# Patient Record
Sex: MALE | Race: WHITE | Employment: OTHER | URBAN - METROPOLITAN AREA
[De-identification: names, ages, dates, MRNs, and addresses within clinical notes are randomized per-mention and may not be internally consistent; named-entity substitution may affect disease eponyms.]

---

## 2017-03-07 PROBLEM — I24.9 ACS (ACUTE CORONARY SYNDROME) (HCC): Status: ACTIVE | Noted: 2017-03-07

## 2017-03-07 PROBLEM — I10 HYPERTENSION: Status: ACTIVE | Noted: 2017-03-07

## 2017-03-07 PROBLEM — E78.5 HYPERLIPIDEMIA: Status: ACTIVE | Noted: 2017-03-07

## 2019-04-17 ENCOUNTER — HOSPITAL ENCOUNTER (EMERGENCY)
Age: 49
Discharge: HOME | End: 2019-04-17
Attending: EMERGENCY MEDICINE
Payer: OTHER GOVERNMENT

## 2019-04-17 VITALS
TEMPERATURE: 97.9 F | RESPIRATION RATE: 18 BRPM | DIASTOLIC BLOOD PRESSURE: 91 MMHG | HEART RATE: 74 BPM | SYSTOLIC BLOOD PRESSURE: 132 MMHG | OXYGEN SATURATION: 98 %

## 2019-04-17 DIAGNOSIS — R11.11 NON-INTRACTABLE VOMITING WITHOUT NAUSEA, UNSPECIFIED VOMITING TYPE: ICD-10-CM

## 2019-04-17 DIAGNOSIS — R20.0 LEFT ARM NUMBNESS: ICD-10-CM

## 2019-04-17 DIAGNOSIS — R25.2 MUSCLE CRAMP: ICD-10-CM

## 2019-04-17 DIAGNOSIS — R06.02 SHORTNESS OF BREATH: ICD-10-CM

## 2019-04-17 DIAGNOSIS — R51.9 ACUTE NONINTRACTABLE HEADACHE, UNSPECIFIED HEADACHE TYPE: ICD-10-CM

## 2019-04-17 DIAGNOSIS — F41.1 ANXIETY STATE: Primary | ICD-10-CM

## 2019-04-17 PROCEDURE — 6370000000 HC RX 637 (ALT 250 FOR IP): Performed by: EMERGENCY MEDICINE

## 2019-04-17 PROCEDURE — 99283 EMERGENCY DEPT VISIT LOW MDM: CPT

## 2019-04-17 RX ORDER — ATORVASTATIN CALCIUM 40 MG/1
TABLET, FILM COATED ORAL
COMMUNITY

## 2019-04-17 RX ORDER — LORAZEPAM 1 MG/1
1 TABLET ORAL ONCE
Status: COMPLETED | OUTPATIENT
Start: 2019-04-17 | End: 2019-04-17

## 2019-04-17 RX ORDER — FLUTICASONE PROPIONATE 50 MCG
SPRAY, SUSPENSION (ML) NASAL
COMMUNITY

## 2019-04-17 RX ORDER — LISINOPRIL AND HYDROCHLOROTHIAZIDE 20; 12.5 MG/1; MG/1
TABLET ORAL
COMMUNITY

## 2019-04-17 RX ORDER — BUPROPION HYDROCHLORIDE 150 MG/1
TABLET, EXTENDED RELEASE ORAL
COMMUNITY

## 2019-04-17 RX ADMIN — LORAZEPAM 1 MG: 1 TABLET ORAL at 18:31

## 2019-04-17 ASSESSMENT — PAIN DESCRIPTION - ORIENTATION: ORIENTATION: LEFT

## 2019-04-17 ASSESSMENT — PAIN SCALES - GENERAL: PAINLEVEL_OUTOF10: 3

## 2019-04-17 ASSESSMENT — PAIN DESCRIPTION - LOCATION: LOCATION: LEG

## 2019-04-17 ASSESSMENT — PAIN DESCRIPTION - PAIN TYPE: TYPE: ACUTE PAIN

## 2019-04-17 NOTE — ED PROVIDER NOTES
[facility]  ED  eMERGENCY dEPARTMENT eNCOUnter        Pt Name: Pema Soto  MRN: [id number]  [place] 1970  Date of evaluation: 4/17/2019  Provider: Michael Sandoval MD  PCP: No primary care provider on file. CHIEF COMPLAINT       Chief Complaint   Patient presents with    Anxiety     c/o hx panic attacks. approx 30 min ago started with hyperventilating and tingling all over. c/o \"knot\" pain in left upper leg       HISTORY OFPRESENT ILLNESS   (Location/Symptom, Timing/Onset, Context/Setting, Quality, Duration, Modifying Factors,Severity)  Note limiting factors. Pema Soto is a 46 y.o. male presenting today with due to concern for anxiety attack. He states that he has had well over 200 anxiety attacks in his life but states he normally is able to control these. His last panic attack was yesterday but today's attack lasted longer than he was used to associated with tingling in all the extremities but worse on the left side and complaining of vomiting once which was a new symptom and having a moderate headache. He also was short of breath but no chest pain. He normally takes 2 mg of Klonopin at home but states due to concern for the attack he did take 4 mg today. By the time I saw him, he was feeling much better but still felt slightly anxious. He also had some cramping in his left thigh muscle. Denies any leg swelling or history of blood clots. No recent prolonged travel. He did have some diarrhea earlier today as well. He does have some mild upper abdominal discomfort at this time. He denies any suicidal or homicidal ideations. No hallucinations. He had a cardiac catheterization in 2017 that was read as normal coronary arteries. Currently his headache is 4 out of 10. He does smoke cigarettes but denies any alcohol or drug use.   No other concerns at this time but again since the panic attack lasted longer than normal with some new symptoms associated with the lisinopril-hydrochlorothiazide (PRINZIDE;ZESTORETIC) 20-12.5 MG per tablet TAKE 1 TABLET BY MOUTH ONCE DAILY FOR BLOOD PRESSUREHistorical Med      buPROPion (WELLBUTRIN SR) 150 MG extended release tablet TAKE ONE TABLET BY MOUTH TWICE A DAYHistorical Med      venlafaxine (EFFEXOR XR) 75 MG extended release capsule Take 3 capsules by mouth daily (with breakfast), Disp-30 capsule, R-0Historical Med      clonazePAM (KLONOPIN) 2 MG tablet Take 2 mg by mouth 2 times daily as needed             ALLERGIES     Bactrim [sulfamethoxazole-trimethoprim]    FAMILY HISTORY     History reviewed. No pertinent family history.        SOCIAL HISTORY       Social History     Socioeconomic History    Marital status: Single     Spouse name: None    Number of children: None    Years of education: None    Highest education level: None   Occupational History    None   Social Needs    Financial resource strain: None    Food insecurity:     Worry: None     Inability: None    Transportation needs:     Medical: None     Non-medical: None   Tobacco Use    Smoking status: Current Every Day Smoker     Packs/day: 0.50    Smokeless tobacco: Never Used   Substance and Sexual Activity    Alcohol use: No    Drug use: No    Sexual activity: None   Lifestyle    Physical activity:     Days per week: None     Minutes per session: None    Stress: None   Relationships    Social connections:     Talks on phone: None     Gets together: None     Attends Gnosticism service: None     Active member of club or organization: None     Attends meetings of clubs or organizations: None     Relationship status: None    Intimate partner violence:     Fear of current or ex partner: None     Emotionally abused: None     Physically abused: None     Forced sexual activity: None   Other Topics Concern    None   Social History Narrative    None       SCREENINGS      @FLOW([id number])@      PHYSICAL EXAM    (up to 7 for level 4, 8 or more for level 5)     ED Triage Vitals [04/17/19 1631]   BP Temp Temp Source Pulse Resp SpO2 Height Weight   (!) 141/89 97.9 °F (36.6 °C) Oral 72 22 99 % -- --       Physical Exam   Constitutional: He is oriented to person, place, and time. He appears well-developed and well-nourished. He is active and cooperative. Non-toxic appearance. He does not have a sickly appearance. He does not appear ill. No distress. HENT:   Head: Normocephalic and atraumatic. Nose: Nose normal.   Mouth/Throat: Mucous membranes are normal. No oropharyngeal exudate. Eyes: Pupils are equal, round, and reactive to light. Conjunctivae and EOM are normal. Right eye exhibits no discharge. Left eye exhibits no discharge. No scleral icterus. Neck: Trachea normal, normal range of motion and full passive range of motion without pain. Neck supple. No neck rigidity. No tracheal deviation, no edema, no erythema and normal range of motion present. Cardiovascular: Normal rate, regular rhythm, intact distal pulses and normal pulses. Exam reveals no gallop and no friction rub. Pulmonary/Chest: Effort normal and breath sounds normal. No accessory muscle usage or stridor. No respiratory distress. He has no decreased breath sounds. He has no wheezes. He has no rhonchi. He has no rales. He exhibits no tenderness. Abdominal: Soft. Bowel sounds are normal. He exhibits no distension. There is no tenderness. There is no rebound and no guarding. Musculoskeletal: Normal range of motion. He exhibits no edema, tenderness or deformity. Neurological: He is alert and oriented to person, place, and time. He has normal strength. He is not disoriented. He displays no atrophy and no tremor. No cranial nerve deficit or sensory deficit. He exhibits normal muscle tone. He displays no seizure activity. Gait normal. GCS eye subscore is 4. GCS verbal subscore is 5. GCS motor subscore is 6. Skin: Skin is warm and dry. No rash noted. He is not diaphoretic. No erythema. No pallor. Psychiatric: He has a normal mood and affect. His speech is normal and behavior is normal. Judgment and thought content normal. He is not actively hallucinating. Cognition and memory are normal. He does not express impulsivity or inappropriate judgment. He expresses no homicidal and no suicidal ideation. He expresses no suicidal plans and no homicidal plans. He is attentive. Nursing note and vitals reviewed. NIHSS = 0 on my initial exam       DIAGNOSTIC RESULTS   LABS:    Labs Reviewed - No data to display    All other labs were within normal range or not returned asof this dictation. EKG: All EKG's are interpreted by the Emergency Department Physician who either signs or Co-signs this chart in the absence of a cardiologist.     Patient declined. RADIOLOGY:   Non-plain film images such as CT, Ultrasound and MRI are read by the radiologist. Alanis Yang images are visualized and preliminarily interpreted by the  ED Provider with the belowfindings:        Interpretation per the Radiologist below, if available at the time of this note:    No orders to display         PROCEDURES   Unless otherwise noted below, none     Procedures    CRITICAL CARE TIME   N/A    CONSULTS:  None    EMERGENCY DEPARTMENT COURSE and DIFFERENTIAL DIAGNOSIS/MDM:   Vitals:    Vitals:    04/17/19 1631 04/17/19 1833   BP: (!) 141/89 (!) 132/91   Pulse: 72 74   Resp: 22 18   Temp: 97.9 °F (36.6 °C)    TempSrc: Oral    SpO2: 99% 98%       Patient was given the following medications:  Medications   LORazepam (ATIVAN) tablet 1 mg (1 mg Oral Given 4/17/19 1831)     Patient was evaluated due to concern for anxiety attack that was worse than typical attacks although by the time I saw him his symptoms had mostly resolved.   Since he did state he had tingling on the entire left side of the body which was slightly different although did have tingling as well on the right but worse on the left, I did offer to do a CTA of the head and neck case this could be related to a TIA like syndrome. He has full mental capacity to make his own medical decisions and understands if it was a TIA, it could lead to a stroke in the future but at this time he declines understanding that if it was a stroke, it could cause severe disability or death. He has complained of tingling of muscle spasms to his left leg and I did discuss with him that there is always a slight chance he could have a blood clot and we could do a d-dimer to further evaluate his risk. He declined this as well understanding for was a blood clot it could cause severe disability or death. He declined any EKG or lab work. He did have a normal recent normal cardiac catheterization 2 years ago and therefore chance of acute coronary syndrome unlikely. Abdominal exam was benign and he denied wanting any medications for the initial nausea or upper abdominal pain that he did experience after vomiting. [facility] but states at this time he feels this was a panic attack and is not wanting further testing but would be happy to take the 1 mg of Ativan. He was well-appearing and in no acute distress at time of discharge and understands if he has any worsening tingling on the left side of his body, development of chest pain with shortness of breath, worsening headache, then return to the emergency department, but otherwise follow up tomorrow as [facility] since this anxiety attack was different from his typical attacks. The patient tolerated their visit well. The patient and / or the family were informed of the results of any tests, a time was given to answer questions. FINAL IMPRESSION      1. Anxiety state    2. Left arm numbness    3. Muscle cramp    4. Non-intractable vomiting without nausea, unspecified vomiting type    5. Acute nonintractable headache, unspecified headache type    6.  Shortness of breath DISPOSITION/PLAN   DISPOSITION Decision To Discharge 04/17/2019 06:43:56 PM      PATIENT REFERRED TO:  [facility]  ED  [address] [facility] [address] [facility]  Go to   If symptoms worsen    Your primary doctor    Go in 1 day  Go tomorrow like you plan to go to the [address] [facility] for further evaluation since you are declining all treatment today      DISCHARGEMEDICATIONS:  Discharge Medication List as of 4/17/2019  6:45 PM          DISCONTINUED MEDICATIONS:  Discharge Medication List as of 4/17/2019  6:45 PM                 (Please note that portions of this note were completed with a voicerecognition program.  Efforts were made to edit the dictations but occasionally words are mis-transcribed.)    Dixon Connolly MD (electronically signed)            Dixon Connolly MD  04/18/19 9092

## 2019-04-18 ASSESSMENT — ENCOUNTER SYMPTOMS
DIARRHEA: 1
CHEST TIGHTNESS: 0
SHORTNESS OF BREATH: 1
ABDOMINAL PAIN: 1
COLOR CHANGE: 0
VOMITING: 1
NAUSEA: 1
CONSTIPATION: 0
BACK PAIN: 0

## 2019-05-24 ENCOUNTER — APPOINTMENT (OUTPATIENT)
Dept: GENERAL RADIOLOGY | Age: 49
End: 2019-05-24
Payer: OTHER GOVERNMENT

## 2019-05-24 ENCOUNTER — HOSPITAL ENCOUNTER (EMERGENCY)
Age: 49
Discharge: HOME | End: 2019-05-24
Attending: EMERGENCY MEDICINE
Payer: OTHER GOVERNMENT

## 2019-05-24 VITALS
WEIGHT: 195 LBS | TEMPERATURE: 98.4 F | HEART RATE: 68 BPM | HEIGHT: 73 IN | RESPIRATION RATE: 16 BRPM | SYSTOLIC BLOOD PRESSURE: 164 MMHG | OXYGEN SATURATION: 100 % | DIASTOLIC BLOOD PRESSURE: 99 MMHG | BODY MASS INDEX: 25.84 KG/M2

## 2019-05-24 DIAGNOSIS — F41.0 PANIC ATTACK: Primary | ICD-10-CM

## 2019-05-24 LAB
A/G RATIO: 1.7 (ref 1.1–2.2)
ALBUMIN SERPL-MCNC: 4 G/DL (ref 3.4–5)
ALP BLD-CCNC: 53 U/L (ref 40–129)
ALT SERPL-CCNC: 51 U/L (ref 10–40)
ANION GAP SERPL CALCULATED.3IONS-SCNC: 15 MMOL/L (ref 3–16)
AST SERPL-CCNC: 23 U/L (ref 15–37)
BILIRUB SERPL-MCNC: 0.3 MG/DL (ref 0–1)
BUN BLDV-MCNC: 16 MG/DL (ref 7–20)
CALCIUM SERPL-MCNC: 9.7 MG/DL (ref 8.3–10.6)
CHLORIDE BLD-SCNC: 101 MMOL/L (ref 99–110)
CO2: 24 MMOL/L (ref 21–32)
CREAT SERPL-MCNC: 1.1 MG/DL (ref 0.9–1.3)
GFR AFRICAN AMERICAN: >60
GFR NON-AFRICAN AMERICAN: >60
GLOBULIN: 2.4 G/DL
GLUCOSE BLD-MCNC: 95 MG/DL (ref 70–99)
HCT VFR BLD CALC: 40.9 % (ref 40.5–52.5)
HEMOGLOBIN: 14 G/DL (ref 13.5–17.5)
MCH RBC QN AUTO: 32.9 PG (ref 26–34)
MCHC RBC AUTO-ENTMCNC: 34.3 G/DL (ref 31–36)
MCV RBC AUTO: 95.9 FL (ref 80–100)
PDW BLD-RTO: 13.1 % (ref 12.4–15.4)
PLATELET # BLD: 177 K/UL (ref 135–450)
PMV BLD AUTO: 8.1 FL (ref 5–10.5)
POTASSIUM SERPL-SCNC: 3.8 MMOL/L (ref 3.5–5.1)
RBC # BLD: 4.26 M/UL (ref 4.2–5.9)
SODIUM BLD-SCNC: 140 MMOL/L (ref 136–145)
TOTAL PROTEIN: 6.4 G/DL (ref 6.4–8.2)
TROPONIN: <0.01 NG/ML
WBC # BLD: 8.2 K/UL (ref 4–11)

## 2019-05-24 PROCEDURE — 99284 EMERGENCY DEPT VISIT MOD MDM: CPT

## 2019-05-24 PROCEDURE — 85027 COMPLETE CBC AUTOMATED: CPT

## 2019-05-24 PROCEDURE — 71045 X-RAY EXAM CHEST 1 VIEW: CPT

## 2019-05-24 PROCEDURE — 80053 COMPREHEN METABOLIC PANEL: CPT

## 2019-05-24 PROCEDURE — 93005 ELECTROCARDIOGRAM TRACING: CPT | Performed by: EMERGENCY MEDICINE

## 2019-05-24 PROCEDURE — 96374 THER/PROPH/DIAG INJ IV PUSH: CPT

## 2019-05-24 PROCEDURE — 6360000002 HC RX W HCPCS: Performed by: EMERGENCY MEDICINE

## 2019-05-24 PROCEDURE — 84484 ASSAY OF TROPONIN QUANT: CPT

## 2019-05-24 RX ORDER — LORAZEPAM 2 MG/ML
1 INJECTION INTRAMUSCULAR ONCE
Status: COMPLETED | OUTPATIENT
Start: 2019-05-24 | End: 2019-05-24

## 2019-05-24 RX ADMIN — LORAZEPAM 1 MG: 2 INJECTION, SOLUTION INTRAMUSCULAR; INTRAVENOUS at 20:41

## 2019-05-24 ASSESSMENT — ENCOUNTER SYMPTOMS
WHEEZING: 0
BLOOD IN STOOL: 0
STRIDOR: 0
RHINORRHEA: 0
CHEST TIGHTNESS: 0
EYE PAIN: 0
PHOTOPHOBIA: 0
TROUBLE SWALLOWING: 0
NAUSEA: 0
EYE REDNESS: 0
ANAL BLEEDING: 0
SORE THROAT: 0
COUGH: 0
DIARRHEA: 0
ABDOMINAL PAIN: 0
EYE DISCHARGE: 0
CONSTIPATION: 0
EYE ITCHING: 0
FACIAL SWELLING: 0
ABDOMINAL DISTENTION: 0
BACK PAIN: 0
VOMITING: 0
VOICE CHANGE: 0
SHORTNESS OF BREATH: 0
SINUS PRESSURE: 0

## 2019-05-24 ASSESSMENT — PAIN DESCRIPTION - PAIN TYPE: TYPE: ACUTE PAIN

## 2019-05-24 ASSESSMENT — PAIN DESCRIPTION - DESCRIPTORS: DESCRIPTORS: SPASM

## 2019-05-24 ASSESSMENT — PAIN SCALES - GENERAL: PAINLEVEL_OUTOF10: 4

## 2019-05-24 ASSESSMENT — PAIN DESCRIPTION - LOCATION: LOCATION: CHEST

## 2019-05-25 LAB
EKG ATRIAL RATE: 69 BPM
EKG DIAGNOSIS: NORMAL
EKG P AXIS: 32 DEGREES
EKG P-R INTERVAL: 200 MS
EKG Q-T INTERVAL: 368 MS
EKG QRS DURATION: 108 MS
EKG QTC CALCULATION (BAZETT): 394 MS
EKG R AXIS: 32 DEGREES
EKG T AXIS: 20 DEGREES
EKG VENTRICULAR RATE: 69 BPM

## 2019-05-25 PROCEDURE — 93010 ELECTROCARDIOGRAM REPORT: CPT | Performed by: INTERNAL MEDICINE

## 2019-05-25 NOTE — ED PROVIDER NOTES
 Years of education: Not on file    Highest education level: Not on file   Occupational History    Not on file   Social Needs    Financial resource strain: Not on file    Food insecurity:     Worry: Not on file     Inability: Not on file    Transportation needs:     Medical: Not on file     Non-medical: Not on file   Tobacco Use    Smoking status: Current Every Day Smoker     Packs/day: 0.50    Smokeless tobacco: Never Used   Substance and Sexual Activity    Alcohol use: No    Drug use: No    Sexual activity: Not on file   Lifestyle    Physical activity:     Days per week: Not on file     Minutes per session: Not on file    Stress: Not on file   Relationships    Social connections:     Talks on phone: Not on file     Gets together: Not on file     Attends Mandaen service: Not on file     Active member of club or organization: Not on file     Attends meetings of clubs or organizations: Not on file     Relationship status: Not on file    Intimate partner violence:     Fear of current or ex partner: Not on file     Emotionally abused: Not on file     Physically abused: Not on file     Forced sexual activity: Not on file   Other Topics Concern    Not on file   Social History Narrative    Not on file         Review of Systems   Constitutional: Negative for activity change, appetite change, chills, diaphoresis, fatigue and fever. HENT: Negative. Negative for congestion, dental problem, ear pain, facial swelling, rhinorrhea, sinus pressure, sneezing, sore throat, tinnitus, trouble swallowing and voice change. Eyes: Negative for photophobia, pain, discharge, redness, itching and visual disturbance. Respiratory: Negative for cough, chest tightness, shortness of breath, wheezing and stridor. Cardiovascular: Negative for chest pain, palpitations and leg swelling.    Gastrointestinal: Negative for abdominal distention, abdominal pain, anal bleeding, blood in stool, constipation, diarrhea, nausea and vomiting. Genitourinary: Negative for difficulty urinating, discharge, dysuria, frequency, hematuria, testicular pain and urgency. Musculoskeletal: Negative for back pain, joint swelling, neck pain and neck stiffness. Skin: Negative for rash and wound. Neurological: Negative for dizziness, syncope, facial asymmetry, speech difficulty, weakness, numbness and headaches. Hematological: Does not bruise/bleed easily. Psychiatric/Behavioral: Positive for agitation. Negative for confusion, hallucinations, self-injury, sleep disturbance and suicidal ideas. The patient is nervous/anxious. All other systems reviewed and are negative. Physical Exam   Constitutional: He is oriented to person, place, and time. He appears well-developed and well-nourished. No distress. HENT:   Head: Normocephalic and atraumatic. Right Ear: External ear normal.   Left Ear: External ear normal.   Nose: Nose normal.   Mouth/Throat: Oropharynx is clear and moist. No oropharyngeal exudate. Eyes: Pupils are equal, round, and reactive to light. Conjunctivae and EOM are normal. Right eye exhibits no discharge. Left eye exhibits no discharge. No scleral icterus. Neck: Normal range of motion. Neck supple. No JVD present. No tracheal deviation present. Cardiovascular: Normal rate, regular rhythm, normal heart sounds and intact distal pulses. Exam reveals no gallop and no friction rub. No murmur heard. Pulmonary/Chest: Effort normal and breath sounds normal. No respiratory distress. He has no wheezes. He has no rales. Abdominal: Soft. Bowel sounds are normal. He exhibits no distension and no mass. There is no tenderness. There is no rebound and no guarding. Musculoskeletal: Normal range of motion. He exhibits no edema or tenderness. Lymphadenopathy:     He has no cervical adenopathy. Neurological: He is alert and oriented to person, place, and time. He has normal strength and normal reflexes.  He displays normal reflexes. No cranial nerve deficit. He exhibits normal muscle tone. Coordination normal. GCS eye subscore is 4. GCS verbal subscore is 5. GCS motor subscore is 6. Reflex Scores:       Bicep reflexes are 2+ on the right side and 2+ on the left side. Patellar reflexes are 2+ on the right side and 2+ on the left side. Coordination, gait, speech, balance and cognition are intact. There is no nuchal rigidity or evidence of meningismus. Negative Kernig's and Brudzinski's signs. All sensory and motor components of the brachial/lumbosacral plexus tested are symmetric and intact. No focal deficits appreciated. Skin: Skin is warm and dry. No rash noted. No erythema. No pallor. Psychiatric: He has a normal mood and affect. His behavior is normal. Judgment and thought content normal.   Nursing note and vitals reviewed.       Procedures    MDM  Results for orders placed or performed during the hospital encounter of 05/24/19   CBC   Result Value Ref Range    WBC 8.2 4.0 - 11.0 K/uL    RBC 4.26 4.20 - 5.90 M/uL    Hemoglobin 14.0 13.5 - 17.5 g/dL    Hematocrit 40.9 40.5 - 52.5 %    MCV 95.9 80.0 - 100.0 fL    MCH 32.9 26.0 - 34.0 pg    MCHC 34.3 31.0 - 36.0 g/dL    RDW 13.1 12.4 - 15.4 %    Platelets 939 031 - 553 K/uL    MPV 8.1 5.0 - 10.5 fL   Comprehensive Metabolic Panel   Result Value Ref Range    Sodium 140 136 - 145 mmol/L    Potassium 3.8 3.5 - 5.1 mmol/L    Chloride 101 99 - 110 mmol/L    CO2 24 21 - 32 mmol/L    Anion Gap 15 3 - 16    Glucose 95 70 - 99 mg/dL    BUN 16 7 - 20 mg/dL    CREATININE 1.1 0.9 - 1.3 mg/dL    GFR Non-African American >60 >60    GFR African American >60 >60    Calcium 9.7 8.3 - 10.6 mg/dL    Total Protein 6.4 6.4 - 8.2 g/dL    Alb 4.0 3.4 - 5.0 g/dL    Albumin/Globulin Ratio 1.7 1.1 - 2.2    Total Bilirubin 0.3 0.0 - 1.0 mg/dL    Alkaline Phosphatase 53 40 - 129 U/L    ALT 51 (H) 10 - 40 U/L    AST 23 15 - 37 U/L    Globulin 2.4 g/dL   Troponin   Result Value Ref Range

## 2021-06-22 ENCOUNTER — APPOINTMENT (OUTPATIENT)
Dept: GENERAL RADIOLOGY | Age: 51
End: 2021-06-22
Payer: OTHER GOVERNMENT

## 2021-06-22 ENCOUNTER — APPOINTMENT (OUTPATIENT)
Dept: CT IMAGING | Age: 51
End: 2021-06-22
Payer: OTHER GOVERNMENT

## 2021-06-22 ENCOUNTER — HOSPITAL ENCOUNTER (EMERGENCY)
Age: 51
Discharge: ANOTHER HOSPITAL | End: 2021-06-23
Attending: EMERGENCY MEDICINE
Payer: OTHER GOVERNMENT

## 2021-06-22 VITALS
OXYGEN SATURATION: 100 % | BODY MASS INDEX: 24.78 KG/M2 | WEIGHT: 187 LBS | HEART RATE: 71 BPM | TEMPERATURE: 98 F | RESPIRATION RATE: 15 BRPM | DIASTOLIC BLOOD PRESSURE: 81 MMHG | SYSTOLIC BLOOD PRESSURE: 125 MMHG | HEIGHT: 73 IN

## 2021-06-22 DIAGNOSIS — R79.89 ELEVATED LFTS: ICD-10-CM

## 2021-06-22 DIAGNOSIS — R20.2 NUMBNESS AND TINGLING IN LEFT ARM: ICD-10-CM

## 2021-06-22 DIAGNOSIS — R07.9 CHEST PAIN, UNSPECIFIED TYPE: Primary | ICD-10-CM

## 2021-06-22 DIAGNOSIS — R20.0 NUMBNESS AND TINGLING IN LEFT ARM: ICD-10-CM

## 2021-06-22 LAB
A/G RATIO: 1.6 (ref 1.1–2.2)
ALBUMIN SERPL-MCNC: 4.3 G/DL (ref 3.4–5)
ALP BLD-CCNC: 56 U/L (ref 40–129)
ALT SERPL-CCNC: 73 U/L (ref 10–40)
ANION GAP SERPL CALCULATED.3IONS-SCNC: 8 MMOL/L (ref 3–16)
AST SERPL-CCNC: 49 U/L (ref 15–37)
BASOPHILS ABSOLUTE: 0.1 K/UL (ref 0–0.2)
BASOPHILS RELATIVE PERCENT: 0.7 %
BILIRUB SERPL-MCNC: 0.4 MG/DL (ref 0–1)
BUN BLDV-MCNC: 9 MG/DL (ref 7–20)
CALCIUM SERPL-MCNC: 9.2 MG/DL (ref 8.3–10.6)
CHLORIDE BLD-SCNC: 97 MMOL/L (ref 99–110)
CO2: 27 MMOL/L (ref 21–32)
CREAT SERPL-MCNC: 1 MG/DL (ref 0.9–1.3)
EOSINOPHILS ABSOLUTE: 0.2 K/UL (ref 0–0.6)
EOSINOPHILS RELATIVE PERCENT: 1.7 %
ETHANOL: NORMAL MG/DL (ref 0–0.08)
GFR AFRICAN AMERICAN: >60
GFR NON-AFRICAN AMERICAN: >60
GLOBULIN: 2.7 G/DL
GLUCOSE BLD-MCNC: 106 MG/DL (ref 70–99)
HCT VFR BLD CALC: 43.4 % (ref 40.5–52.5)
HEMOGLOBIN: 14.7 G/DL (ref 13.5–17.5)
LIPASE: 41 U/L (ref 13–60)
LYMPHOCYTES ABSOLUTE: 1.9 K/UL (ref 1–5.1)
LYMPHOCYTES RELATIVE PERCENT: 21 %
MCH RBC QN AUTO: 32.3 PG (ref 26–34)
MCHC RBC AUTO-ENTMCNC: 33.9 G/DL (ref 31–36)
MCV RBC AUTO: 95.3 FL (ref 80–100)
MONOCYTES ABSOLUTE: 0.7 K/UL (ref 0–1.3)
MONOCYTES RELATIVE PERCENT: 7.9 %
NEUTROPHILS ABSOLUTE: 6.1 K/UL (ref 1.7–7.7)
NEUTROPHILS RELATIVE PERCENT: 68.7 %
PDW BLD-RTO: 14.2 % (ref 12.4–15.4)
PLATELET # BLD: 186 K/UL (ref 135–450)
PMV BLD AUTO: 8 FL (ref 5–10.5)
POTASSIUM REFLEX MAGNESIUM: 4.5 MMOL/L (ref 3.5–5.1)
RBC # BLD: 4.55 M/UL (ref 4.2–5.9)
SARS-COV-2, NAAT: NOT DETECTED
SODIUM BLD-SCNC: 132 MMOL/L (ref 136–145)
TOTAL PROTEIN: 7 G/DL (ref 6.4–8.2)
TROPONIN: <0.01 NG/ML
WBC # BLD: 9 K/UL (ref 4–11)

## 2021-06-22 PROCEDURE — 83690 ASSAY OF LIPASE: CPT

## 2021-06-22 PROCEDURE — 85025 COMPLETE CBC W/AUTO DIFF WBC: CPT

## 2021-06-22 PROCEDURE — 87635 SARS-COV-2 COVID-19 AMP PRB: CPT

## 2021-06-22 PROCEDURE — 93005 ELECTROCARDIOGRAM TRACING: CPT | Performed by: EMERGENCY MEDICINE

## 2021-06-22 PROCEDURE — 6370000000 HC RX 637 (ALT 250 FOR IP): Performed by: EMERGENCY MEDICINE

## 2021-06-22 PROCEDURE — 71045 X-RAY EXAM CHEST 1 VIEW: CPT

## 2021-06-22 PROCEDURE — 70496 CT ANGIOGRAPHY HEAD: CPT

## 2021-06-22 PROCEDURE — 80053 COMPREHEN METABOLIC PANEL: CPT

## 2021-06-22 PROCEDURE — 99284 EMERGENCY DEPT VISIT MOD MDM: CPT

## 2021-06-22 PROCEDURE — 84484 ASSAY OF TROPONIN QUANT: CPT

## 2021-06-22 PROCEDURE — 82077 ASSAY SPEC XCP UR&BREATH IA: CPT

## 2021-06-22 PROCEDURE — 6360000004 HC RX CONTRAST MEDICATION: Performed by: EMERGENCY MEDICINE

## 2021-06-22 PROCEDURE — 70450 CT HEAD/BRAIN W/O DYE: CPT

## 2021-06-22 RX ORDER — ASPIRIN 325 MG
325 TABLET ORAL ONCE
Status: COMPLETED | OUTPATIENT
Start: 2021-06-22 | End: 2021-06-22

## 2021-06-22 RX ADMIN — ASPIRIN 325 MG: 325 TABLET ORAL at 21:46

## 2021-06-22 RX ADMIN — IOPAMIDOL 75 ML: 755 INJECTION, SOLUTION INTRAVENOUS at 21:08

## 2021-06-22 ASSESSMENT — ENCOUNTER SYMPTOMS
VOMITING: 1
CHEST TIGHTNESS: 1
BACK PAIN: 0
COLOR CHANGE: 0
ABDOMINAL PAIN: 1
PHOTOPHOBIA: 0
WHEEZING: 0
RHINORRHEA: 0
SHORTNESS OF BREATH: 0
NAUSEA: 1

## 2021-06-22 ASSESSMENT — PAIN SCALES - GENERAL: PAINLEVEL_OUTOF10: 6

## 2021-06-23 LAB
EKG ATRIAL RATE: 73 BPM
EKG DIAGNOSIS: NORMAL
EKG P AXIS: 26 DEGREES
EKG P-R INTERVAL: 220 MS
EKG Q-T INTERVAL: 352 MS
EKG QRS DURATION: 100 MS
EKG QTC CALCULATION (BAZETT): 387 MS
EKG R AXIS: 23 DEGREES
EKG T AXIS: 43 DEGREES
EKG VENTRICULAR RATE: 73 BPM

## 2021-06-23 PROCEDURE — 93010 ELECTROCARDIOGRAM REPORT: CPT | Performed by: INTERNAL MEDICINE

## 2021-06-23 NOTE — ED NOTES
[facility]  Per [address] [facility],[address] for continuity of care; Chest Pain  2244-  returned page and accepted patient.  -Tx center stated Rapid Covid test needs to be ordered before transfer. Also Transfer Packet will be faxed over. 8199- Packet received and gave to ED RN Alba to complete. Packet completed and faxed back over to [id number] [place] [address] 24 called verifying transport needs and said would call back with room assignment and transport Art called with Room Assignment ( [phone]).   - [facility] ETA @[phone]  - Nurse will be Benita and report # [phone]     Latanya Cuello  06/23/21 0024

## 2021-06-23 NOTE — ED NOTES
Report given to [facility]. Pt transported via [facility].        Fernanda Addison, RN  06/23/21 0604

## 2021-06-23 NOTE — ED PROVIDER NOTES
[address] [facility]  ED  EMERGENCY DEPARTMENTENCOUNTER      Pt Name: Robert Celeste  MRN: [id number]  [place] 1970  Date ofevaluation: 6/22/2021  Provider: Yumiko Mariano MD    CHIEF COMPLAINT       Chief Complaint   Patient presents with    Chest Pain     starting around 1730 tonight. pt reports this has happened multiple times recently with anxiety. pt reports he is already starting to feel better. denies nausea at this time    Anxiety         HISTORY OF PRESENT ILLNESS   (Location/Symptom, Timing/Onset,Context/Setting, Quality, Duration, Modifying Factors, Severity)  Note limiting factors. Robert Celeste is a 46 y.o. male  who  has a past medical history of Depression, Hyperlipidemia, Hypertension, and PTSD (post-traumatic stress disorder). who presents to the emergency department for evaluation of left-sided upper quadrant abdominal pain, chest pain, and paresthesias and numbness in the left upper and lower extremities. Patient reports that his symptoms began spontaneously at about 430. He reports it has been similar to previous panic attacks he had in the past but reports he had associated nausea and vomiting which is atypical as well as tingling in the left upper and lower extremities. He reports reproducible tenderness to left upper quadrant. He states that he took 3 muscle relaxers which did not improve his symptoms. He states that is present that he came so intense that he called EMS and was brought to the ED. he states he is feeling better since arriving to the ED. he does report residual sensory deficits in left upper extremity. He denies recent illness or changes in medications. Patient does have a history of hypertension hyperlipidemia. He had a catheterization in 2017 without any critical stenosis. HPI    NursingNotes were reviewed.     REVIEW OF SYSTEMS    (2-9 systems for level 4, 10 or more for level 5)     Review of Systems   Constitutional: Negative for activity change, chills and fever. HENT: Negative for congestion and rhinorrhea. Eyes: Negative for photophobia and visual disturbance. Respiratory: Positive for chest tightness. Negative for shortness of breath and wheezing. Cardiovascular: Negative for palpitations and leg swelling. Gastrointestinal: Positive for abdominal pain, nausea and vomiting. Endocrine: Negative for polydipsia and polyuria. Genitourinary: Negative for difficulty urinating and frequency. Musculoskeletal: Positive for myalgias. Negative for arthralgias, back pain, gait problem, neck pain and neck stiffness. Skin: Negative for color change and rash. Neurological: Positive for light-headedness. Negative for dizziness, syncope, weakness, numbness and headaches. Psychiatric/Behavioral: Negative for confusion. The patient is nervous/anxious. All other systems reviewed and are negative. Except as noted above the remainder of the review of systems was reviewed and negative.        PAST MEDICAL HISTORY     Past Medical History:   Diagnosis Date    Depression     Hyperlipidemia     Hypertension     PTSD (post-traumatic stress disorder)          SURGICALHISTORY       Past Surgical History:   Procedure Laterality Date    CARDIAC CATHETERIZATION  03/07/2017    normal cath Dr Rowdy Maher       Previous Medications    ATORVASTATIN (LIPITOR) 40 MG TABLET    TAKE ONE-HALF TABLET BY MOUTH AT BEDTIME FOR CHOLESTEROL **NOTE DIRECTIONS**    BUPROPION (WELLBUTRIN SR) 150 MG EXTENDED RELEASE TABLET    TAKE ONE TABLET BY MOUTH TWICE A DAY    CLONAZEPAM (KLONOPIN) 2 MG TABLET    Take 2 mg by mouth 2 times daily as needed    FLUTICASONE (FLONASE) 50 MCG/ACT NASAL SPRAY    USE 2 SPRAYS IN EACH NOSTRIL ONCE DAILY FOR NASAL ALLERGY    LISINOPRIL-HYDROCHLOROTHIAZIDE (PRINZIDE;ZESTORETIC) 20-12.5 MG PER TABLET    TAKE 1 TABLET BY MOUTH ONCE DAILY FOR BLOOD PRESSURE    VENLAFAXINE (EFFEXOR XR) 75 MG EXTENDED RELEASE CAPSULE    Take 3 capsules by mouth daily (with breakfast)            Bactrim [sulfamethoxazole-trimethoprim]    FAMILY HISTORY     History reviewed. No pertinent family history. SOCIAL HISTORY       Social History     Socioeconomic History    Marital status: Single     Spouse name: None    Number of children: None    Years of education: None    Highest education level: None   Occupational History    None   Tobacco Use    Smoking status: Current Some Day Smoker     Packs/day: 0.50    Smokeless tobacco: Never Used   Substance and Sexual Activity    Alcohol use: No    Drug use: No    Sexual activity: None   Other Topics Concern    None   Social History Narrative    None     Social Determinants of Health     Financial Resource Strain:     Difficulty of Paying Living Expenses:    Food Insecurity:     Worried About Running Out of Food in the Last Year:     Ran Out of Food in the Last Year:    Transportation Needs:     Lack of Transportation (Medical):  Lack of Transportation (Non-Medical):    Physical Activity:     Days of Exercise per Week:     Minutes of Exercise per Session:    Stress:     Feeling of Stress :    Social Connections:     Frequency of Communication with Friends and Family:     Frequency of Social Gatherings with Friends and Family:     Attends [employer] Services:     Active Member of Clubs or Organizations:     Attends Club or Organization Meetings:     Marital Status:    Intimate Partner Violence:     Fear of Current or Ex-Partner:     Emotionally Abused:     Physically Abused:     Sexually Abused:        SCREENINGS   NIH Stroke Scale  NIH Stroke Scale Assessed: Yes  Interval: Baseline  Level of Consciousness (1a. ): Alert  LOC Questions (1b. ):  Answers both correctly  LOC Commands (1c. ): Performs both tasks correctly  Best Gaze (2. ): Normal  Visual (3. ): No visual loss  Facial Palsy (4. ): Normal symmetrical movement  Motor Arm, Left (5a. ): No drift  Motor Arm, Right (5b. ): No drift  Motor Leg, Left (6a. ): No drift  Motor Leg, Right (6b. ): No drift  Limb Ataxia (7. ): Absent  Sensory (8. ): (!) Mild to Moderate  Best Language (9. ): No aphasia  Dysarthria (10. ): Normal  Extinction and Inattention (11): No abnormality  Total: 1         PHYSICAL EXAM    (up to 7 for level 4, 8 or more for level 5)     ED Triage Vitals [06/22/21 1933]   BP Temp Temp Source Pulse Resp SpO2 Height Weight   (!) 149/100 98 °F (36.7 °C) Oral 78 18 97 % 6' 1\" (1.854 m) 187 lb (84.8 kg)       Physical Exam  Vitals and nursing note reviewed. Constitutional:       General: He is not in acute distress. Appearance: He is well-developed. HENT:      Head: Normocephalic and atraumatic. Mouth/Throat:      Mouth: Mucous membranes are moist.   Eyes:      Extraocular Movements: Extraocular movements intact. Conjunctiva/sclera: Conjunctivae normal.      Pupils: Pupils are equal, round, and reactive to light. Neck:      Trachea: No tracheal deviation. Cardiovascular:      Rate and Rhythm: Normal rate and regular rhythm. Pulmonary:      Effort: Pulmonary effort is normal. No respiratory distress. Breath sounds: Normal breath sounds. No stridor. No wheezing or rales. Abdominal:      General: There is no distension. Palpations: Abdomen is soft. Tenderness: There is no abdominal tenderness. Musculoskeletal:         General: No deformity. Normal range of motion. Cervical back: Normal range of motion. Skin:     General: Skin is warm and dry. Capillary Refill: Capillary refill takes less than 2 seconds. Neurological:      General: No focal deficit present. Mental Status: He is alert and oriented to person, place, and time. Mental status is at baseline. Cranial Nerves: No cranial nerve deficit. Sensory: Sensory deficit present. Motor: No weakness.          RESULTS     EKG: All EKG's are interpreted by the Emergency Department Physician who either signs or sclerosis, seizure, other    -  Work-up included:  See above  -  ED treatment included: See above  -  Results discussed with patient. Patient resents the ED for evaluation of chest pains lightheadedness and paresthesias in the left side. He states that his symptoms feel similar to her previous panic attack but denies a previous history of associated nausea vomiting or left-sided paresthesias. On physical exam he does report a sensory deficit in the left upper extremity compared to the right. He has a negative drift no facial droop. No dysarthria. He is alert and oriented. NIH is 1. Patient is insistent that his symptom onset was at 430. Although the patient is at the TPA window stroke alert was initiated. Labs show no emergent laboratory abnormalities. LFT's slightly elevated. Troponin within normal limits . Imaging studies show hypoattenuation of the april that is calcified concerning for possible mass. Patient feels well on reevaluation. Patient is sensory deficits have resolved on reevaluation. He has no drift in extremities. Is the patient's chest pain with atypical associated symptoms and incidental intracranial mass I did recommend admission to the hospital for further medical management patient does go to the [address] [place] [address] and A was consulted and agreed to accept the patient for transfer. . Patient transferred in stable condition. .  The patient is agreeable with plan of care and disposition. REASSESSMENT     ED Course as of Jun 22 2302   Tue Jun 22, 2021   2100 Case discussed with the stroke neurologist.  Based on the timing of the patient's symptoms as well as minimal disability they did not recommend given the patient TPA. [CS]      ED Course User Index  [CS] Nilo Mcfarland MD         CRITICAL CARE TIME   Total Critical Care time was 30 minutes, excluding separatelyreportable procedures. There was a high probability ofclinically significant/life threatening deterioration in the patient's

## 2022-12-02 ENCOUNTER — HOSPITAL ENCOUNTER (INPATIENT)
Age: 52
LOS: 3 days | Discharge: HOME | DRG: 872 | End: 2022-12-05
Attending: EMERGENCY MEDICINE | Admitting: HOSPITALIST
Payer: OTHER GOVERNMENT

## 2022-12-02 ENCOUNTER — APPOINTMENT (OUTPATIENT)
Dept: CT IMAGING | Age: 52
DRG: 872 | End: 2022-12-02
Payer: OTHER GOVERNMENT

## 2022-12-02 ENCOUNTER — APPOINTMENT (OUTPATIENT)
Dept: GENERAL RADIOLOGY | Age: 52
DRG: 872 | End: 2022-12-02
Payer: OTHER GOVERNMENT

## 2022-12-02 DIAGNOSIS — K55.9 ISCHEMIC COLITIS (HCC): Primary | ICD-10-CM

## 2022-12-02 DIAGNOSIS — E87.6 HYPOKALEMIA: ICD-10-CM

## 2022-12-02 DIAGNOSIS — R65.20 SEVERE SEPSIS (HCC): ICD-10-CM

## 2022-12-02 DIAGNOSIS — N17.9 ACUTE KIDNEY INJURY (HCC): ICD-10-CM

## 2022-12-02 DIAGNOSIS — A41.9 SEVERE SEPSIS (HCC): ICD-10-CM

## 2022-12-02 DIAGNOSIS — K52.9 COLITIS: ICD-10-CM

## 2022-12-02 DIAGNOSIS — K62.5 RECTAL BLEEDING: ICD-10-CM

## 2022-12-02 DIAGNOSIS — E83.42 HYPOMAGNESEMIA: ICD-10-CM

## 2022-12-02 PROBLEM — Z72.0 TOBACCO ABUSE: Status: ACTIVE | Noted: 2022-12-02

## 2022-12-02 PROBLEM — K92.2 ACUTE LOWER GI HEMORRHAGE: Status: ACTIVE | Noted: 2022-12-02

## 2022-12-02 PROBLEM — F10.939 ALCOHOL WITHDRAWAL SYNDROME WITH COMPLICATION (HCC): Status: ACTIVE | Noted: 2022-12-02

## 2022-12-02 LAB
A/G RATIO: 1.9 (ref 1.1–2.2)
ABO/RH: NORMAL
ALBUMIN SERPL-MCNC: 4.5 G/DL (ref 3.4–5)
ALP BLD-CCNC: 85 U/L (ref 40–129)
ALT SERPL-CCNC: 64 U/L (ref 10–40)
AMMONIA: 19 UMOL/L (ref 16–60)
AMPHETAMINE SCREEN, URINE: ABNORMAL
ANION GAP SERPL CALCULATED.3IONS-SCNC: 14 MMOL/L (ref 3–16)
ANTIBODY SCREEN: NORMAL
APTT: 31.2 SEC (ref 23–34.3)
AST SERPL-CCNC: 61 U/L (ref 15–37)
BARBITURATE SCREEN URINE: ABNORMAL
BASOPHILS ABSOLUTE: 0.1 K/UL (ref 0–0.2)
BASOPHILS RELATIVE PERCENT: 0.5 %
BENZODIAZEPINE SCREEN, URINE: ABNORMAL
BILIRUB SERPL-MCNC: 1.1 MG/DL (ref 0–1)
BILIRUBIN URINE: NEGATIVE
BLOOD, URINE: NEGATIVE
BUN BLDV-MCNC: 21 MG/DL (ref 7–20)
CALCIUM SERPL-MCNC: 9.7 MG/DL (ref 8.3–10.6)
CANNABINOID SCREEN URINE: ABNORMAL
CHLORIDE BLD-SCNC: 95 MMOL/L (ref 99–110)
CLARITY: CLEAR
CO2: 26 MMOL/L (ref 21–32)
COCAINE METABOLITE SCREEN URINE: ABNORMAL
COLOR: YELLOW
CREAT SERPL-MCNC: 1.4 MG/DL (ref 0.9–1.3)
EOSINOPHILS ABSOLUTE: 0 K/UL (ref 0–0.6)
EOSINOPHILS RELATIVE PERCENT: 0.3 %
ETHANOL: 42 MG/DL (ref 0–0.08)
FENTANYL SCREEN, URINE: ABNORMAL
GFR SERPL CREATININE-BSD FRML MDRD: 60 ML/MIN/{1.73_M2}
GLUCOSE BLD-MCNC: 96 MG/DL (ref 70–99)
GLUCOSE URINE: NEGATIVE MG/DL
HCT VFR BLD CALC: 44.7 % (ref 40.5–52.5)
HEMOGLOBIN: 15.1 G/DL (ref 13.5–17.5)
INR BLD: 0.96 (ref 0.87–1.14)
KETONES, URINE: NEGATIVE MG/DL
LACTIC ACID, SEPSIS: 2 MMOL/L (ref 0.4–1.9)
LACTIC ACID, SEPSIS: 2.4 MMOL/L (ref 0.4–1.9)
LACTIC ACID, SEPSIS: 3.7 MMOL/L (ref 0.4–1.9)
LEUKOCYTE ESTERASE, URINE: NEGATIVE
LIPASE: 47 U/L (ref 13–60)
LYMPHOCYTES ABSOLUTE: 2.3 K/UL (ref 1–5.1)
LYMPHOCYTES RELATIVE PERCENT: 16 %
Lab: ABNORMAL
MAGNESIUM: 1.5 MG/DL (ref 1.8–2.4)
MAGNESIUM: 1.7 MG/DL (ref 1.8–2.4)
MCH RBC QN AUTO: 32 PG (ref 26–34)
MCHC RBC AUTO-ENTMCNC: 33.7 G/DL (ref 31–36)
MCV RBC AUTO: 94.9 FL (ref 80–100)
METHADONE SCREEN, URINE: ABNORMAL
MICROSCOPIC EXAMINATION: NORMAL
MONOCYTES ABSOLUTE: 1 K/UL (ref 0–1.3)
MONOCYTES RELATIVE PERCENT: 7.2 %
NEUTROPHILS ABSOLUTE: 10.8 K/UL (ref 1.7–7.7)
NEUTROPHILS RELATIVE PERCENT: 76 %
NITRITE, URINE: NEGATIVE
OCCULT BLOOD SCREENING: ABNORMAL
OPIATE SCREEN URINE: POSITIVE
OXYCODONE URINE: ABNORMAL
PDW BLD-RTO: 13.6 % (ref 12.4–15.4)
PH UA: 6.5
PH UA: 6.5 (ref 5–8)
PHENCYCLIDINE SCREEN URINE: ABNORMAL
PLATELET # BLD: 201 K/UL (ref 135–450)
PLATELET SLIDE REVIEW: ABNORMAL
PMV BLD AUTO: 8.2 FL (ref 5–10.5)
POTASSIUM REFLEX MAGNESIUM: 3.3 MMOL/L (ref 3.5–5.1)
PROTEIN UA: NEGATIVE MG/DL
PROTHROMBIN TIME: 12.7 SEC (ref 11.7–14.5)
RBC # BLD: 4.71 M/UL (ref 4.2–5.9)
SLIDE REVIEW: ABNORMAL
SODIUM BLD-SCNC: 135 MMOL/L (ref 136–145)
SPECIFIC GRAVITY UA: 1.01 (ref 1–1.03)
SPECIMEN STATUS: NORMAL
TOTAL CK: 219 U/L (ref 39–308)
TOTAL PROTEIN: 6.9 G/DL (ref 6.4–8.2)
TROPONIN: <0.01 NG/ML
URINE REFLEX TO CULTURE: NORMAL
URINE TYPE: NORMAL
UROBILINOGEN, URINE: 0.2 E.U./DL
WBC # BLD: 14.2 K/UL (ref 4–11)

## 2022-12-02 PROCEDURE — 2580000003 HC RX 258: Performed by: HOSPITALIST

## 2022-12-02 PROCEDURE — C9113 INJ PANTOPRAZOLE SODIUM, VIA: HCPCS | Performed by: REGISTERED NURSE

## 2022-12-02 PROCEDURE — 96374 THER/PROPH/DIAG INJ IV PUSH: CPT

## 2022-12-02 PROCEDURE — 74177 CT ABD & PELVIS W/CONTRAST: CPT

## 2022-12-02 PROCEDURE — 83690 ASSAY OF LIPASE: CPT

## 2022-12-02 PROCEDURE — 2580000003 HC RX 258: Performed by: REGISTERED NURSE

## 2022-12-02 PROCEDURE — 2060000000 HC ICU INTERMEDIATE R&B

## 2022-12-02 PROCEDURE — 82607 VITAMIN B-12: CPT

## 2022-12-02 PROCEDURE — 36415 COLL VENOUS BLD VENIPUNCTURE: CPT

## 2022-12-02 PROCEDURE — 6360000004 HC RX CONTRAST MEDICATION: Performed by: REGISTERED NURSE

## 2022-12-02 PROCEDURE — 80307 DRUG TEST PRSMV CHEM ANLYZR: CPT

## 2022-12-02 PROCEDURE — 6360000002 HC RX W HCPCS: Performed by: HOSPITALIST

## 2022-12-02 PROCEDURE — 80053 COMPREHEN METABOLIC PANEL: CPT

## 2022-12-02 PROCEDURE — 6360000002 HC RX W HCPCS: Performed by: REGISTERED NURSE

## 2022-12-02 PROCEDURE — 84443 ASSAY THYROID STIM HORMONE: CPT

## 2022-12-02 PROCEDURE — C9113 INJ PANTOPRAZOLE SODIUM, VIA: HCPCS | Performed by: HOSPITALIST

## 2022-12-02 PROCEDURE — 96361 HYDRATE IV INFUSION ADD-ON: CPT

## 2022-12-02 PROCEDURE — 93005 ELECTROCARDIOGRAM TRACING: CPT | Performed by: REGISTERED NURSE

## 2022-12-02 PROCEDURE — 6360000002 HC RX W HCPCS

## 2022-12-02 PROCEDURE — 82270 OCCULT BLOOD FECES: CPT

## 2022-12-02 PROCEDURE — 83036 HEMOGLOBIN GLYCOSYLATED A1C: CPT

## 2022-12-02 PROCEDURE — 82077 ASSAY SPEC XCP UR&BREATH IA: CPT

## 2022-12-02 PROCEDURE — 85610 PROTHROMBIN TIME: CPT

## 2022-12-02 PROCEDURE — 81003 URINALYSIS AUTO W/O SCOPE: CPT

## 2022-12-02 PROCEDURE — 86900 BLOOD TYPING SEROLOGIC ABO: CPT

## 2022-12-02 PROCEDURE — 86901 BLOOD TYPING SEROLOGIC RH(D): CPT

## 2022-12-02 PROCEDURE — 86850 RBC ANTIBODY SCREEN: CPT

## 2022-12-02 PROCEDURE — 84484 ASSAY OF TROPONIN QUANT: CPT

## 2022-12-02 PROCEDURE — 83605 ASSAY OF LACTIC ACID: CPT

## 2022-12-02 PROCEDURE — 82140 ASSAY OF AMMONIA: CPT

## 2022-12-02 PROCEDURE — 85730 THROMBOPLASTIN TIME PARTIAL: CPT

## 2022-12-02 PROCEDURE — 82550 ASSAY OF CK (CPK): CPT

## 2022-12-02 PROCEDURE — 85025 COMPLETE CBC W/AUTO DIFF WBC: CPT

## 2022-12-02 PROCEDURE — 83735 ASSAY OF MAGNESIUM: CPT

## 2022-12-02 PROCEDURE — 99285 EMERGENCY DEPT VISIT HI MDM: CPT

## 2022-12-02 PROCEDURE — 6370000000 HC RX 637 (ALT 250 FOR IP): Performed by: HOSPITALIST

## 2022-12-02 PROCEDURE — 71046 X-RAY EXAM CHEST 2 VIEWS: CPT

## 2022-12-02 PROCEDURE — 96375 TX/PRO/DX INJ NEW DRUG ADDON: CPT

## 2022-12-02 PROCEDURE — 6370000000 HC RX 637 (ALT 250 FOR IP): Performed by: REGISTERED NURSE

## 2022-12-02 RX ORDER — LORAZEPAM 1 MG/1
2 TABLET ORAL
Status: DISCONTINUED | OUTPATIENT
Start: 2022-12-02 | End: 2022-12-03

## 2022-12-02 RX ORDER — SODIUM CHLORIDE 0.9 % (FLUSH) 0.9 %
10 SYRINGE (ML) INJECTION EVERY 12 HOURS SCHEDULED
Status: DISCONTINUED | OUTPATIENT
Start: 2022-12-02 | End: 2022-12-05 | Stop reason: HOSPADM

## 2022-12-02 RX ORDER — POTASSIUM CHLORIDE 20 MEQ/1
20 TABLET, EXTENDED RELEASE ORAL ONCE
Status: COMPLETED | OUTPATIENT
Start: 2022-12-02 | End: 2022-12-02

## 2022-12-02 RX ORDER — SODIUM CHLORIDE 0.9 % (FLUSH) 0.9 %
5-40 SYRINGE (ML) INJECTION PRN
Status: DISCONTINUED | OUTPATIENT
Start: 2022-12-02 | End: 2022-12-05 | Stop reason: HOSPADM

## 2022-12-02 RX ORDER — MAGNESIUM SULFATE HEPTAHYDRATE 500 MG/ML
2000 INJECTION, SOLUTION INTRAMUSCULAR; INTRAVENOUS ONCE
Status: DISCONTINUED | OUTPATIENT
Start: 2022-12-02 | End: 2022-12-02 | Stop reason: SDUPTHER

## 2022-12-02 RX ORDER — ONDANSETRON 2 MG/ML
4 INJECTION INTRAMUSCULAR; INTRAVENOUS ONCE
Status: COMPLETED | OUTPATIENT
Start: 2022-12-02 | End: 2022-12-02

## 2022-12-02 RX ORDER — ACETAMINOPHEN 325 MG/1
650 TABLET ORAL EVERY 6 HOURS PRN
Status: DISCONTINUED | OUTPATIENT
Start: 2022-12-02 | End: 2022-12-05 | Stop reason: HOSPADM

## 2022-12-02 RX ORDER — CHLORDIAZEPOXIDE HYDROCHLORIDE 5 MG/1
10 CAPSULE, GELATIN COATED ORAL 3 TIMES DAILY
Status: DISCONTINUED | OUTPATIENT
Start: 2022-12-02 | End: 2022-12-03

## 2022-12-02 RX ORDER — SODIUM CHLORIDE 9 MG/ML
INJECTION, SOLUTION INTRAVENOUS PRN
Status: DISCONTINUED | OUTPATIENT
Start: 2022-12-02 | End: 2022-12-05 | Stop reason: HOSPADM

## 2022-12-02 RX ORDER — LANOLIN ALCOHOL/MO/W.PET/CERES
100 CREAM (GRAM) TOPICAL DAILY
Status: DISCONTINUED | OUTPATIENT
Start: 2022-12-02 | End: 2022-12-05 | Stop reason: HOSPADM

## 2022-12-02 RX ORDER — LORAZEPAM 2 MG/ML
INJECTION INTRAMUSCULAR
Status: COMPLETED
Start: 2022-12-02 | End: 2022-12-02

## 2022-12-02 RX ORDER — LABETALOL HYDROCHLORIDE 5 MG/ML
5 INJECTION, SOLUTION INTRAVENOUS EVERY 4 HOURS PRN
Status: DISCONTINUED | OUTPATIENT
Start: 2022-12-02 | End: 2022-12-05 | Stop reason: HOSPADM

## 2022-12-02 RX ORDER — M-VIT,TX,IRON,MINS/CALC/FOLIC 27MG-0.4MG
1 TABLET ORAL DAILY
Status: DISCONTINUED | OUTPATIENT
Start: 2022-12-02 | End: 2022-12-05 | Stop reason: HOSPADM

## 2022-12-02 RX ORDER — MORPHINE SULFATE 4 MG/ML
4 INJECTION, SOLUTION INTRAMUSCULAR; INTRAVENOUS ONCE
Status: COMPLETED | OUTPATIENT
Start: 2022-12-02 | End: 2022-12-02

## 2022-12-02 RX ORDER — LORAZEPAM 2 MG/ML
4 INJECTION INTRAMUSCULAR
Status: DISCONTINUED | OUTPATIENT
Start: 2022-12-02 | End: 2022-12-03

## 2022-12-02 RX ORDER — LORAZEPAM 1 MG/1
4 TABLET ORAL
Status: DISCONTINUED | OUTPATIENT
Start: 2022-12-02 | End: 2022-12-03

## 2022-12-02 RX ORDER — SODIUM CHLORIDE 0.9 % (FLUSH) 0.9 %
5-40 SYRINGE (ML) INJECTION EVERY 12 HOURS SCHEDULED
Status: DISCONTINUED | OUTPATIENT
Start: 2022-12-02 | End: 2022-12-05 | Stop reason: HOSPADM

## 2022-12-02 RX ORDER — 0.9 % SODIUM CHLORIDE 0.9 %
1655 INTRAVENOUS SOLUTION INTRAVENOUS ONCE
Status: COMPLETED | OUTPATIENT
Start: 2022-12-02 | End: 2022-12-02

## 2022-12-02 RX ORDER — MAGNESIUM SULFATE IN WATER 40 MG/ML
2000 INJECTION, SOLUTION INTRAVENOUS ONCE
Status: COMPLETED | OUTPATIENT
Start: 2022-12-02 | End: 2022-12-02

## 2022-12-02 RX ORDER — LORAZEPAM 2 MG/ML
1 INJECTION INTRAMUSCULAR
Status: DISCONTINUED | OUTPATIENT
Start: 2022-12-02 | End: 2022-12-03

## 2022-12-02 RX ORDER — LISINOPRIL 20 MG/1
20 TABLET ORAL DAILY
Status: DISCONTINUED | OUTPATIENT
Start: 2022-12-03 | End: 2022-12-05 | Stop reason: HOSPADM

## 2022-12-02 RX ORDER — SODIUM CHLORIDE 0.9 % (FLUSH) 0.9 %
10 SYRINGE (ML) INJECTION PRN
Status: DISCONTINUED | OUTPATIENT
Start: 2022-12-02 | End: 2022-12-05 | Stop reason: HOSPADM

## 2022-12-02 RX ORDER — LORAZEPAM 2 MG/ML
2 INJECTION INTRAMUSCULAR
Status: DISCONTINUED | OUTPATIENT
Start: 2022-12-02 | End: 2022-12-03

## 2022-12-02 RX ORDER — PANTOPRAZOLE SODIUM 40 MG/10ML
40 INJECTION, POWDER, LYOPHILIZED, FOR SOLUTION INTRAVENOUS 2 TIMES DAILY
Status: DISCONTINUED | OUTPATIENT
Start: 2022-12-02 | End: 2022-12-05 | Stop reason: HOSPADM

## 2022-12-02 RX ORDER — 0.9 % SODIUM CHLORIDE 0.9 %
1000 INTRAVENOUS SOLUTION INTRAVENOUS ONCE
Status: COMPLETED | OUTPATIENT
Start: 2022-12-02 | End: 2022-12-02

## 2022-12-02 RX ORDER — PANTOPRAZOLE SODIUM 40 MG/10ML
80 INJECTION, POWDER, LYOPHILIZED, FOR SOLUTION INTRAVENOUS ONCE
Status: COMPLETED | OUTPATIENT
Start: 2022-12-02 | End: 2022-12-02

## 2022-12-02 RX ORDER — ACETAMINOPHEN 650 MG/1
650 SUPPOSITORY RECTAL EVERY 6 HOURS PRN
Status: DISCONTINUED | OUTPATIENT
Start: 2022-12-02 | End: 2022-12-05 | Stop reason: HOSPADM

## 2022-12-02 RX ORDER — LORAZEPAM 1 MG/1
3 TABLET ORAL
Status: DISCONTINUED | OUTPATIENT
Start: 2022-12-02 | End: 2022-12-03

## 2022-12-02 RX ORDER — LORAZEPAM 1 MG/1
1 TABLET ORAL
Status: DISCONTINUED | OUTPATIENT
Start: 2022-12-02 | End: 2022-12-03

## 2022-12-02 RX ORDER — OCTREOTIDE ACETATE 100 UG/ML
100 INJECTION, SOLUTION INTRAVENOUS; SUBCUTANEOUS ONCE
Status: COMPLETED | OUTPATIENT
Start: 2022-12-02 | End: 2022-12-02

## 2022-12-02 RX ORDER — LORAZEPAM 2 MG/ML
3 INJECTION INTRAMUSCULAR
Status: DISCONTINUED | OUTPATIENT
Start: 2022-12-02 | End: 2022-12-03

## 2022-12-02 RX ADMIN — SODIUM CHLORIDE, PRESERVATIVE FREE 10 ML: 5 INJECTION INTRAVENOUS at 21:53

## 2022-12-02 RX ADMIN — LORAZEPAM 2 MG: 2 INJECTION INTRAMUSCULAR at 19:01

## 2022-12-02 RX ADMIN — IOPAMIDOL 75 ML: 755 INJECTION, SOLUTION INTRAVENOUS at 17:26

## 2022-12-02 RX ADMIN — POTASSIUM CHLORIDE 20 MEQ: 20 TABLET, EXTENDED RELEASE ORAL at 21:54

## 2022-12-02 RX ADMIN — OCTREOTIDE ACETATE 25 MCG/HR: 500 INJECTION, SOLUTION INTRAVENOUS; SUBCUTANEOUS at 20:06

## 2022-12-02 RX ADMIN — POTASSIUM CHLORIDE 20 MEQ: 1500 TABLET, EXTENDED RELEASE ORAL at 18:43

## 2022-12-02 RX ADMIN — LORAZEPAM 2 MG: 2 INJECTION INTRAMUSCULAR at 18:56

## 2022-12-02 RX ADMIN — MAGNESIUM SULFATE HEPTAHYDRATE 2000 MG: 40 INJECTION, SOLUTION INTRAVENOUS at 18:47

## 2022-12-02 RX ADMIN — PIPERACILLIN AND TAZOBACTAM 4500 MG: 4; .5 INJECTION, POWDER, FOR SOLUTION INTRAVENOUS at 21:57

## 2022-12-02 RX ADMIN — MORPHINE SULFATE 4 MG: 4 INJECTION, SOLUTION INTRAMUSCULAR; INTRAVENOUS at 16:57

## 2022-12-02 RX ADMIN — LORAZEPAM 3 MG: 2 INJECTION INTRAMUSCULAR at 20:02

## 2022-12-02 RX ADMIN — ONDANSETRON 4 MG: 2 INJECTION INTRAMUSCULAR; INTRAVENOUS at 16:58

## 2022-12-02 RX ADMIN — SODIUM CHLORIDE 1000 ML: 9 INJECTION, SOLUTION INTRAVENOUS at 16:57

## 2022-12-02 RX ADMIN — CHLORDIAZEPOXIDE HYDROCHLORIDE 10 MG: 5 CAPSULE ORAL at 21:53

## 2022-12-02 RX ADMIN — OCTREOTIDE ACETATE 100 MCG: 100 INJECTION, SOLUTION INTRAVENOUS; SUBCUTANEOUS at 20:03

## 2022-12-02 RX ADMIN — PANTOPRAZOLE SODIUM 80 MG: 40 INJECTION, POWDER, FOR SOLUTION INTRAVENOUS at 16:58

## 2022-12-02 RX ADMIN — PANTOPRAZOLE SODIUM 40 MG: 40 INJECTION, POWDER, FOR SOLUTION INTRAVENOUS at 21:53

## 2022-12-02 RX ADMIN — SODIUM CHLORIDE 1627 ML: 9 INJECTION, SOLUTION INTRAVENOUS at 18:44

## 2022-12-02 ASSESSMENT — ENCOUNTER SYMPTOMS
NAUSEA: 1
ANAL BLEEDING: 1
RHINORRHEA: 0
BACK PAIN: 0
DIARRHEA: 1
TROUBLE SWALLOWING: 0
ABDOMINAL PAIN: 1
BLOOD IN STOOL: 1
SORE THROAT: 0
CONSTIPATION: 0
VOMITING: 0
CHEST TIGHTNESS: 0
SHORTNESS OF BREATH: 0
COUGH: 0
EYE PAIN: 0

## 2022-12-02 ASSESSMENT — PAIN SCALES - GENERAL
PAINLEVEL_OUTOF10: 6
PAINLEVEL_OUTOF10: 5

## 2022-12-02 ASSESSMENT — PAIN - FUNCTIONAL ASSESSMENT: PAIN_FUNCTIONAL_ASSESSMENT: 0-10

## 2022-12-02 ASSESSMENT — PAIN DESCRIPTION - LOCATION: LOCATION: ABDOMEN

## 2022-12-02 NOTE — ED PROVIDER NOTES
I independently performed a history and physical on Kelley Estrada. All diagnostic, treatment, and disposition decisions were made by myself in conjunction with the advanced practice provider/resident. For further details of [facility] emergency department encounter, please see the ANTONIA/resident's documentation. I personally saw the patient and performed a substantive portion of the visit including all aspects of the medical decision making. Briefly, this is a 1year-old male with history of alcohol abuse presenting for evaluation of abdominal pain, rectal bleeding. He states he had an episode of rectal bleeding after a bowel movement today. He has had pain in the abdomen. Last drink alcohol was several days ago. Typically drinks quite a bit of alcohol. He is not on blood thinners. No significant abdominal surgeries. On exam, he has reproducible tenderness in the mid epigastric, supraumbilical region. CT imaging is concerning for potential ischemic colitis, he does have elevated lactate, leukocytosis, general surgery was consulted who recommended IV fluid resuscitation, broad-spectrum antibiotics. He will be admitted for continued management. EKG  The Ekg interpreted by myself in the emergency department in the absence of a cardiologist.  normal sinus rhythm with a rate of 96  Axis is   Normal  QTc is  within an acceptable range  Intervals and Durations are unremarkable. No specific ST-T wave changes appreciated. No evidence of acute ischemia. No significant change from prior EKG dated June 22, 2021      I, Dr. Bentley Pink, am the primary clinician of record. Comment: Please note this report has been produced using speech recognition software and may contain errors related to that system including errors in grammar, punctuation, and spelling, as well as words and phrases that may be inappropriate.  If there are any questions or concerns please feel free to contact the dictating provider for clarification.      Fuentes Black MD  12/02/22 Deyanira Larsen MD  12/02/22 [id number]

## 2022-12-02 NOTE — ED PROVIDER NOTES
[address] [facility]  ED  EMERGENCY DEPARTMENT ENCOUNTER        Pt Name: Zulma Sinha  MRN: [id number]  [place] 1970  Date of evaluation: 12/2/2022  Provider: LASHON Miranda CNP  PCP: No primary care provider on file. This patient was seen and evaluated by the attending physician Angle Gomez MD.    I have evaluated this patient. My supervising physician was available for consultation. CHIEF COMPLAINT       Chief Complaint   Patient presents with    Rectal Bleeding     Patient had multiple bowel movements and has blood on toilet paper. Has had solid and loose stools. When he saw the blood he had an anxiety attack and called 911. HISTORY OF PRESENT ILLNESS   (Location/Symptom, Timing/Onset, Context/Setting, Quality, Duration, Modifying Factors, Severity)  Note limiting factors. Zulma Sinha is a 45 y.o. male who presents via ambulance for abdominal pain, diarrhea, blood in stool. Onset was diarrhea began yesterday with intermittent abdominal pain, abdominal pain became more persistent today and today noticed blood in his stool. Duration has been since the onset. Context includes patient presents to the emergency department today via EMS for evaluation of abdominal pain, diarrhea with blood in his stool. He endorses that his diarrhea began yesterday and he had some intermittent colicky abdominal pain. He states his abdominal pain has been worsening today and he had dark red blood in his stool today. He does not take any blood thinners, he does endorse a history of heavy alcohol use however states he has not been drinking heavily recently. He denies any history of GI bleed. He denies any chest pain, palpitations or swelling in his extremities. He denies any shortness of breath, cough congestion or fevers. He endorses generalized abdominal pain, denies a history of abdominal surgeries. He endorses some intermittent nausea but denies vomiting.   He denies any urinary symptoms including dysuria, urgency, frequency, hematuria or flank pain. He states he has had several episodes of diarrhea today but his last bowel movement was more solid. . Quality is dull and aching abdominal pain with spasming with radiation to his abdomen. Alleviating factors include nothing. Aggravating factors include palpation. Pain is 7/10. Nothing has been used for pain today. Chart review reveals pt has significant PMHx of essential hypertension, hyperlipidemia, acute coronary syndrome, chest pain, PTSD, panic disorder, depression. They take atorvastatin, klonopin, flonase, lisinopril-hydrochlorothiazide, effexor, wellbutrin. Nursing Notes were all reviewed and agreed with or any disagreements were addressed  in the HPI. Pt was seen during the [place] 19 pandemic. Appropriate PPE worn by ME during patient encounters. Pt seen during a time with constrained hospital bed capacity and other potential inpatient and outpatient resources were constrained due to the viral pandemic. REVIEW OF SYSTEMS    (2-9 systems for level 4, 10 or more for level 5)     Review of Systems   Constitutional:  Negative for chills, diaphoresis and fever. HENT:  Negative for congestion, rhinorrhea, sore throat and trouble swallowing. Eyes:  Negative for pain and visual disturbance. Respiratory:  Negative for cough, chest tightness and shortness of breath. Cardiovascular:  Negative for chest pain, palpitations and leg swelling. Gastrointestinal:  Positive for abdominal pain, anal bleeding, blood in stool, diarrhea and nausea. Negative for constipation and vomiting. Genitourinary:  Negative for dysuria, flank pain, frequency, hematuria and urgency. Musculoskeletal:  Negative for back pain, myalgias and neck pain. Skin:  Negative for rash and wound. Neurological:  Negative for dizziness, light-headedness and headaches. Psychiatric/Behavioral:  The patient is nervous/anxious.       Positives and Pertinent negatives as per HPI. Except as noted abovein the ROS, all other systems were reviewed and negative. PAST MEDICAL HISTORY     Past Medical History:   Diagnosis Date    Depression     Hyperlipidemia     Hypertension     PTSD (post-traumatic stress disorder)          SURGICAL HISTORY     Past Surgical History:   Procedure Laterality Date    CARDIAC CATHETERIZATION  03/07/2017    normal cath Dr Ignacio Farrell       Previous Medications    ATORVASTATIN (LIPITOR) 40 MG TABLET    TAKE ONE-HALF TABLET BY MOUTH AT BEDTIME FOR CHOLESTEROL **NOTE DIRECTIONS**    BUPROPION (WELLBUTRIN SR) 150 MG EXTENDED RELEASE TABLET    TAKE ONE TABLET BY MOUTH TWICE A DAY    CLONAZEPAM (KLONOPIN) 2 MG TABLET    Take 2 mg by mouth 2 times daily as needed    FLUTICASONE (FLONASE) 50 MCG/ACT NASAL SPRAY    USE 2 SPRAYS IN EACH NOSTRIL ONCE DAILY FOR NASAL ALLERGY    LISINOPRIL-HYDROCHLOROTHIAZIDE (PRINZIDE;ZESTORETIC) 20-12.5 MG PER TABLET    TAKE 1 TABLET BY MOUTH ONCE DAILY FOR BLOOD PRESSURE    VENLAFAXINE (EFFEXOR XR) 75 MG EXTENDED RELEASE CAPSULE    Take 3 capsules by mouth daily (with breakfast)         ALLERGIES     Bactrim [sulfamethoxazole-trimethoprim]    FAMILYHISTORY     History reviewed. No pertinent family history.        SOCIAL HISTORY       Social History     Socioeconomic History    Marital status: Single     Spouse name: None    Number of children: None    Years of education: None    Highest education level: None   Tobacco Use    Smoking status: Some Days     Packs/day: 0.50     Types: Cigarettes    Smokeless tobacco: Never   Substance and Sexual Activity    Alcohol use: No    Drug use: No       SCREENINGS    [place] Coma Scale  Eye Opening: Spontaneous  Best Verbal Response: Oriented  Best Motor Response: Obeys commands  Jonatan Coma Scale Score: 15        PHYSICAL EXAM    (up to 7 for level 4, 8 or more for level 5)     ED Triage Vitals [12/02/22 1459]   BP Temp Temp Source Heart Rate Resp SpO2 Height Weight   122/77 98.8 °F (37.1 °C) Oral (!) 105 16 100 % 6' 1\" (1.854 m) 195 lb (88.5 kg)       Physical Exam  Vitals and nursing note reviewed. Constitutional:       General: He is not in acute distress. Appearance: Normal appearance. He is not ill-appearing, toxic-appearing or diaphoretic. HENT:      Head: Normocephalic and atraumatic. Right Ear: External ear normal.      Left Ear: External ear normal.      Mouth/Throat:      Mouth: Mucous membranes are moist.      Pharynx: Oropharynx is clear. Eyes:      General:         Right eye: No discharge. Left eye: No discharge. Cardiovascular:      Rate and Rhythm: Regular rhythm. Tachycardia present. Pulses:           Radial pulses are 2+ on the right side and 2+ on the left side. Heart sounds: Normal heart sounds. No murmur heard. No friction rub. No gallop. Pulmonary:      Effort: Pulmonary effort is normal. No respiratory distress. Breath sounds: Normal breath sounds. No stridor. No wheezing, rhonchi or rales. Chest:      Chest wall: No tenderness. Abdominal:      General: Abdomen is flat. Bowel sounds are normal.      Palpations: Abdomen is soft. Tenderness: There is generalized abdominal tenderness. There is no right CVA tenderness or left CVA tenderness. Hernia: No hernia is present. Genitourinary:     Rectum: Guaiac result positive. Tenderness present. No mass, anal fissure, external hemorrhoid or internal hemorrhoid. Normal anal tone. Musculoskeletal:         General: No tenderness. Normal range of motion. Cervical back: Normal range of motion and neck supple. Right lower leg: No edema. Left lower leg: No edema. Skin:     General: Skin is warm and dry. Capillary Refill: Capillary refill takes less than 2 seconds. Neurological:      General: No focal deficit present. Mental Status: He is alert and oriented to person, place, and time.       GCS: GCS eye subscore is 4. GCS verbal subscore is 5. GCS motor subscore is 6.    Psychiatric:         Mood and Affect: Mood normal.         Behavior: Behavior normal.     PHYSICAL EXAM  /88   Pulse 95   Temp 98.8 °F (37.1 °C) (Oral)   Resp 13   Ht 6' 1\" (1.854 m)   Wt 195 lb (88.5 kg)   SpO2 97%   BMI 25.73 kg/m²       DIAGNOSTIC RESULTS   LABS:    Labs Reviewed   CBC WITH AUTO DIFFERENTIAL - Abnormal; Notable for the following components:       Result Value    WBC 14.2 (*)     Neutrophils Absolute 10.8 (*)     All other components within normal limits   COMPREHENSIVE METABOLIC PANEL W/ REFLEX TO MG FOR LOW K - Abnormal; Notable for the following components:    Sodium 135 (*)     Potassium reflex Magnesium 3.3 (*)     Chloride 95 (*)     BUN 21 (*)     Creatinine 1.4 (*)     Est, Glom Filt Rate 60 (*)     Total Bilirubin 1.1 (*)     ALT 64 (*)     AST 61 (*)     All other components within normal limits   LACTATE, SEPSIS - Abnormal; Notable for the following components:    Lactic Acid, Sepsis 3.7 (*)     All other components within normal limits   LACTATE, SEPSIS - Abnormal; Notable for the following components:    Lactic Acid, Sepsis 2.0 (*)     All other components within normal limits   BLOOD OCCULT STOOL SCREEN #1 - Abnormal; Notable for the following components:    Occult Blood Screening   (*)     Value: Result: POSITIVE  Normal range: Negative      All other components within normal limits    Narrative:     ORDER#: [id number]                          ORDERED BY: Verenice Gutierrez  SOURCE: Stool                              COLLECTED:  12/02/22 16:22  ANTIBIOTICS AT ROBBY.:                      RECEIVED :  12/02/22 16:40   MAGNESIUM - Abnormal; Notable for the following components:    Magnesium 1.50 (*)     All other components within normal limits   MAGNESIUM - Abnormal; Notable for the following components:    Magnesium 1.70 (*)     All other components within normal limits   LIPASE   TROPONIN   URINALYSIS WITH REFLEX TO CULTURE   ETHANOL   PROTIME-INR   AMMONIA   APTT   SAMPLE POSSIBLE BLOOD BANK TESTING   COMPREHENSIVE METABOLIC PANEL W/ REFLEX TO MG FOR LOW K   CALCIUM, IONIZED   LIPASE   VITAMIN B12   TSH   HEMOGLOBIN A1C   CBC WITH AUTO DIFFERENTIAL   LACTATE, SEPSIS   LACTATE, SEPSIS   URINE DRUG SCREEN   POCT OCCULT BLOOD STOOL COLON CA SCREEN 1-3   POCT GLUCOSE       All other labs were within normal range or not returned as of this dictation. EKG: All EKG's are interpreted by the Emergency Department Physician who either signs orCo-signs this chart in the absence of a cardiologist.  Please see their note for interpretation of EKG. RADIOLOGY:   Non-plain film images such as CT, Ultrasound and MRI are read by the radiologist. Plain radiographic images are visualized andpreliminarily interpreted by the  ED Provider with the below findings:        Interpretation perthe Radiologist below, if available at the time of this note:    CT ABDOMEN PELVIS W IV CONTRAST Additional Contrast? None   Final Result   1. Acute colitis splenic flexure and descending colon, typical distribution   for ischemic colitis, though infection or inflammatory processes are a   consideration well. Splanchnic vasculature appears patent. 2. Mild calcific atherosclerosis aorta and branches. 3.  No findings to suggest acute appendicitis; no ureter calculus or   hydronephrosis. XR CHEST (2 VW)   Final Result   Hazy opacity lingula left upper lobe may reflect focal pneumonia or   subsegmental atelectasis. Chest radiograph surveillance recommended at 4-6   weeks to ensure clearing. No results found.        PROCEDURES   Unless otherwise noted below, none     Procedures    CRITICAL CARE TIME   N/A    CONSULTS:  IP CONSULT TO GENERAL SURGERY  IP CONSULT TO SOCIAL WORK  IP CONSULT TO GI      EMERGENCY DEPARTMENT COURSE and DIFFERENTIALDIAGNOSIS/MDM:   Vitals:    Vitals:    12/02/22 1742 12/02/22 1812 12/02/22 1849 12/02/22 1954   BP: (!) 128/90 (!) 128/100 (!) 146/88 124/88   Pulse: 92 (!) 105 (!) 104 95   Resp: 15 13     Temp:       TempSrc:       SpO2: 97% 97%     Weight:       Height:           Patient was given thefollowing medications:  Medications   magnesium sulfate 2000 mg in 50 mL IVPB premix (2,000 mg IntraVENous New Bag 12/2/22 1847)   piperacillin-tazobactam (ZOSYN) 4,500 mg in dextrose 5 % 100 mL IVPB (mini-bag) (has no administration in time range)   sodium chloride flush 0.9 % injection 10 mL (has no administration in time range)   sodium chloride flush 0.9 % injection 10 mL (has no administration in time range)   0.9 % sodium chloride infusion (has no administration in time range)   LORazepam (ATIVAN) tablet 1 mg ( Oral See Alternative 12/2/22 2002)     Or   LORazepam (ATIVAN) injection 1 mg ( IntraVENous See Alternative 12/2/22 2002)     Or   LORazepam (ATIVAN) tablet 2 mg ( Oral See Alternative 12/2/22 2002)     Or   LORazepam (ATIVAN) injection 2 mg ( IntraVENous See Alternative 12/2/22 2002)     Or   LORazepam (ATIVAN) tablet 3 mg ( Oral See Alternative 12/2/22 2002)     Or   LORazepam (ATIVAN) injection 3 mg (3 mg IntraVENous Given 12/2/22 2002)     Or   LORazepam (ATIVAN) tablet 4 mg ( Oral See Alternative 12/2/22 2002)     Or   LORazepam (ATIVAN) injection 4 mg ( IntraVENous See Alternative 12/2/22 2002)   thiamine tablet 100 mg (has no administration in time range)   therapeutic multivitamin-minerals 1 tablet (has no administration in time range)   potassium chloride (KLOR-CON M) extended release tablet 20 mEq (has no administration in time range)   octreotide (SANDOSTATIN) injection 100 mcg (has no administration in time range)   octreotide (SANDOSTATIN) 500 mcg in sodium chloride 0.9 % 100 mL infusion (has no administration in time range)   0.9 % sodium chloride bolus (0 mLs IntraVENous Stopped 12/2/22 1844)   ondansetron (ZOFRAN) injection 4 mg (4 mg IntraVENous Given 12/2/22 1658)   morphine sulfate (PF) injection 4 mg (4 mg IntraVENous Given 12/2/22 1657)   pantoprazole (PROTONIX) injection 80 mg (80 mg IntraVENous Given 12/2/22 1658)   iopamidol (ISOVUE-370) 76 % injection 75 mL (75 mLs IntraVENous Given 12/2/22 1726)   0.9 % sodium chloride bolus (1,627 mLs IntraVENous New Bag 12/2/22 1844)   potassium chloride (KLOR-CON M) extended release tablet 20 mEq (20 mEq Oral Given 12/2/22 1843)       PDMP Monitoring:    Last PDMP Ry as Reviewed [facility]):  Review User Review Instant Review Result          Last Controlled Substance Monitoring Documentation      [id number] [facility] ED from 5/24/2019 in [facility]  ED   Comments patient alert and oriented, reports less anxiety at this time filed at 05/24/2019 2130          Urine Drug Screenings (1 yr)    No resulted procedures found. Medication Contract and Consent for Opioid Use Documents Filed        No documents found                    MDM:   This patient was seen and evaluated by myself and my attending physician. He presents to the emergency department today with complaints of abdominal pain and blood in his stool. He states the symptoms have been going on for several days. On exam he is alert and oriented, he is hemodynamically stable however is tachycardic and nontoxic in appearance. He does endorse a history of anxiety and \"panic attacks\". He states he feels very anxious and \"panicky\" right now. He will have a work-up in the emergency department consisting of laboratory studies and imaging. He will be medicated with morphine for pain, Zofran for nausea and given IV fluids while his diagnostic evaluation is pending. Laboratory studies and images were evaluated and reviewed with the patient. PT/INR within normal limit 12.7 and 0.96. CBC does reveal leukocytosis of 14.2. CMP does reveal potassium 3.3, he will be given 20 mEq p.o. supplementation. BUN elevated 21, creatinine 1.4 revealing acute kidney injury.   Liver enzymes mildly elevated at ALT 64 and AST 61, elevated bilirubin at 1.1. Lipase 47. Lactic elevated at 3.7. Troponin negative at 0.01. EKG interpreted by the attending physician. Final detected at 43. Blood occult stool is positive. Medium 1.5, he will receive 2 g magnesium via parenteral replacement. Chest x-ray interpreted by the radiologist for hazy opacity in the lingula left upper lobe may reflect a focal pneumonia or subsegmental atelectasis. Commend follow-up chest radiograph in 4 to 6 weeks. CT abdomen pelvis with IV contrast interpreted by the radiologist for acute colitis, splenic flexure and descending colon typical distribution for ischemic colitis. No findings to suggest acute appendicitis. Consult placed to general surgery and I spoke with Dr. Steve Lopez who recommended admission with hospital medicine. I did discuss with him that Zosyn was initiated in the emergency department as well as fluid resuscitation at 30 mL/kg. He was in agreement with this plan and had no further recommendations. Consult placed to hospital medicine for disposition of admission. On reevaluation the patient did report his pain was slightly improved after receiving medications in the emergency department. Initial presenting tachycardia had started to downtrend. Hospital medicine has concern for rectal variceal bleed. Patient did disclose to her that he drank 24 beers 2 days ago which was his last drink. When I had this conversation with the patient he stated he had not had anything to drink in several weeks. Consult placed to GI at the request of hospital medicine. I spoke with [place] Almeida who recommended against octreotide and also recommended holding the patient's hydrochlorothiazide and lisinopril. He was made aware that Zosyn was given in the emergency department and felt this was appropriate coverage.   I did pass along the recommendations to hospital medicine team.    I personally saw the patient and independently provided 20 minutes of non-concurrent critical care out of the total shared critical care time provided. This excludes time spent doing separately billable procedures. This includes time at the bedside, data interpretation, medication management, obtaining critical history from collateral sources if the patient is unable to provide it directly, and physician consultation. Specifics of interventions taken and potentially life-threatening diagnostic considerations are listed above in the medical decision making. If this was a shared visit with a Physician the time in this attestation is non-concurrent critical care time out of the total shared critical care time provided by the Physician and myself. Is this patient to be included in the SEP-1 Core Measure due to severe sepsis or septic shock? Yes   SEP-1 CORE MEASURE DATA      Sepsis Criteria   Severe Sepsis Criteria   Septic Shock Criteria     Must be confirmed or suspected to move forward with diagnosis of sepsis. Must meet 2:    [] Temperature > 100.9 F (38.3 C)        or < 96.8 F (36 C)  [x] HR > 90  [x] RR > 20  [x] WBC > 12 or < 4 or 10% bands      AND:      [x] Infection Confirmed or        Suspected. Must meet 1:    [x] Lactate > 2       or   [] Signs of Organ Dysfunction:    - SBP < 90 or MAP < 65  - Altered mental status  - Creatinine > 2 or increased from      baseline  - Urine Output < 0.5 ml/kg/hr  - Bilirubin > 2  - INR > 1.5 (not anticoagulated)  - Platelets < 799,480  - Acute Respiratory Failure as     evidenced by new need for NIPPV     or mechanical ventilation      [] No criteria met for Severe Sepsis. Must meet 1:    [] Lactate > 4        or   [] SBP < 90 or MAP < 65 for at        least two readings in the first        hour after fluid bolus        administration      [] Vasopressors initiated (if hypotension persists after fluid resuscitation)        [] No criteria met for Septic Shock.    Patient Vitals for the past 6 hrs:   BP Temp Pulse Resp SpO2 Height Weight Weight Method Percent Weight Change   12/02/22 1459 122/77 98.8 °F (37.1 °C) (!) 105 16 100 % 6' 1\" (1.854 m) 195 lb (88.5 kg) Stated 0   12/02/22 1515 -- -- (!) 115 18 96 % -- -- -- --   12/02/22 1525 -- -- (!) 120 30 96 % -- -- -- --   12/02/22 1642 (!) 110/91 -- (!) 105 25 92 % -- -- -- --   12/02/22 1712 (!) 124/92 -- (!) 102 21 96 % -- -- -- --   12/02/22 1742 (!) 128/90 -- 92 15 97 % -- -- -- --   12/02/22 1812 (!) 128/100 -- (!) 105 13 97 % -- -- -- --   12/02/22 1849 (!) 146/88 -- (!) 104 -- -- -- -- -- --   12/02/22 1954 124/88 -- 95 -- -- -- -- -- --      Recent Labs     12/02/22  1622 12/02/22  1702   WBC 14.2*  --    CREATININE 1.4*  --    BILITOT 1.1*  --    INR  --  0.96     --          Time Severe Sepsis Identified: 1530    Fluid Resuscitation Rational: at least 30mL/kg based on entered actual weight at time of triage      Repeat lactate level: lactic downtrending, repeat lactic 2.0    Reassessment Exam:   Not applicable. Patient does not have septic shock. Discharge Time out:  CC Reviewed Yes   Test Results Yes     Vitals:    12/02/22 1954   BP: 124/88   Pulse: 95   Resp:    Temp:    SpO2:               FINAL IMPRESSION      1. Ischemic colitis ([facility] [address]    2. Acute kidney injury ([facility] [address]    3. Severe sepsis ([facility] [address]    4. Hypokalemia    5. Hypomagnesemia          DISPOSITION/PLAN   DISPOSITION Admitted 12/02/2022 07:00:14 PM      PATIENT REFERREDTO:  No follow-up provider specified.     DISCHARGE MEDICATIONS:  New Prescriptions    No medications on file       DISCONTINUED MEDICATIONS:  Discontinued Medications    No medications on file              (Please note that portions ofthis note were completed with a voice recognition program.  Efforts were made to edit the dictations but occasionally words are mis-transcribed.)    LASHON Swift CNP (electronically signed)       LASHON Swift CNP  12/02/22 2003

## 2022-12-02 NOTE — [facility] REMOTE MONITORING
Spoke with primary RN Casa Pope regarding 3 and 6 hour Sepsis bundle.   Thank you,    Sandra Gonzáles [address], [address]  Callback# [phone]

## 2022-12-03 PROBLEM — K55.9 ISCHEMIC COLITIS (HCC): Status: ACTIVE | Noted: 2022-12-03

## 2022-12-03 LAB
A/G RATIO: 1.7 (ref 1.1–2.2)
ALBUMIN SERPL-MCNC: 3.5 G/DL (ref 3.4–5)
ALP BLD-CCNC: 92 U/L (ref 40–129)
ALT SERPL-CCNC: 49 U/L (ref 10–40)
ANION GAP SERPL CALCULATED.3IONS-SCNC: 7 MMOL/L (ref 3–16)
AST SERPL-CCNC: 42 U/L (ref 15–37)
BASOPHILS ABSOLUTE: 0.1 K/UL (ref 0–0.2)
BASOPHILS RELATIVE PERCENT: 0.9 %
BILIRUB SERPL-MCNC: 1.8 MG/DL (ref 0–1)
BUN BLDV-MCNC: 15 MG/DL (ref 7–20)
CALCIUM IONIZED: 1.1 MMOL/L (ref 1.12–1.32)
CALCIUM SERPL-MCNC: 8.4 MG/DL (ref 8.3–10.6)
CHLORIDE BLD-SCNC: 97 MMOL/L (ref 99–110)
CO2: 29 MMOL/L (ref 21–32)
CREAT SERPL-MCNC: 1.1 MG/DL (ref 0.9–1.3)
EKG ATRIAL RATE: 96 BPM
EKG DIAGNOSIS: NORMAL
EKG P AXIS: 8 DEGREES
EKG P-R INTERVAL: 212 MS
EKG Q-T INTERVAL: 340 MS
EKG QRS DURATION: 94 MS
EKG QTC CALCULATION (BAZETT): 429 MS
EKG R AXIS: 3 DEGREES
EKG T AXIS: -3 DEGREES
EKG VENTRICULAR RATE: 96 BPM
EOSINOPHILS ABSOLUTE: 0.1 K/UL (ref 0–0.6)
EOSINOPHILS RELATIVE PERCENT: 1.5 %
ESTIMATED AVERAGE GLUCOSE: 119.8 MG/DL
GFR SERPL CREATININE-BSD FRML MDRD: >60 ML/MIN/{1.73_M2}
GLUCOSE BLD-MCNC: 131 MG/DL (ref 70–99)
HBA1C MFR BLD: 5.8 %
HCT VFR BLD CALC: 39 % (ref 40.5–52.5)
HCT VFR BLD CALC: 40 % (ref 40.5–52.5)
HCT VFR BLD CALC: 40.3 % (ref 40.5–52.5)
HCT VFR BLD CALC: 43.2 % (ref 40.5–52.5)
HEMOGLOBIN: 13.4 G/DL (ref 13.5–17.5)
HEMOGLOBIN: 13.5 G/DL (ref 13.5–17.5)
HEMOGLOBIN: 13.9 G/DL (ref 13.5–17.5)
HEMOGLOBIN: 14.7 G/DL (ref 13.5–17.5)
LACTIC ACID: 2.1 MMOL/L (ref 0.4–2)
LIPASE: 39 U/L (ref 13–60)
LYMPHOCYTES ABSOLUTE: 2.3 K/UL (ref 1–5.1)
LYMPHOCYTES RELATIVE PERCENT: 25.5 %
MCH RBC QN AUTO: 33 PG (ref 26–34)
MCHC RBC AUTO-ENTMCNC: 34.5 G/DL (ref 31–36)
MCV RBC AUTO: 95.6 FL (ref 80–100)
MONOCYTES ABSOLUTE: 0.6 K/UL (ref 0–1.3)
MONOCYTES RELATIVE PERCENT: 7 %
NEUTROPHILS ABSOLUTE: 5.8 K/UL (ref 1.7–7.7)
NEUTROPHILS RELATIVE PERCENT: 65.1 %
PDW BLD-RTO: 13.8 % (ref 12.4–15.4)
PH VENOUS: 7.42 (ref 7.35–7.45)
PLATELET # BLD: 148 K/UL (ref 135–450)
PMV BLD AUTO: 7.4 FL (ref 5–10.5)
POTASSIUM REFLEX MAGNESIUM: 3.8 MMOL/L (ref 3.5–5.1)
RBC # BLD: 4.22 M/UL (ref 4.2–5.9)
SODIUM BLD-SCNC: 133 MMOL/L (ref 136–145)
TOTAL PROTEIN: 5.6 G/DL (ref 6.4–8.2)
TSH SERPL DL<=0.05 MIU/L-ACNC: 1.39 UIU/ML (ref 0.27–4.2)
VITAMIN B-12: 481 PG/ML (ref 211–911)
WBC # BLD: 8.8 K/UL (ref 4–11)

## 2022-12-03 PROCEDURE — 97161 PT EVAL LOW COMPLEX 20 MIN: CPT

## 2022-12-03 PROCEDURE — 97165 OT EVAL LOW COMPLEX 30 MIN: CPT

## 2022-12-03 PROCEDURE — 85018 HEMOGLOBIN: CPT

## 2022-12-03 PROCEDURE — C9113 INJ PANTOPRAZOLE SODIUM, VIA: HCPCS | Performed by: HOSPITALIST

## 2022-12-03 PROCEDURE — 80053 COMPREHEN METABOLIC PANEL: CPT

## 2022-12-03 PROCEDURE — 36415 COLL VENOUS BLD VENIPUNCTURE: CPT

## 2022-12-03 PROCEDURE — 83690 ASSAY OF LIPASE: CPT

## 2022-12-03 PROCEDURE — 99254 IP/OBS CNSLTJ NEW/EST MOD 60: CPT | Performed by: SURGERY

## 2022-12-03 PROCEDURE — 83605 ASSAY OF LACTIC ACID: CPT

## 2022-12-03 PROCEDURE — 85014 HEMATOCRIT: CPT

## 2022-12-03 PROCEDURE — 6370000000 HC RX 637 (ALT 250 FOR IP): Performed by: HOSPITALIST

## 2022-12-03 PROCEDURE — 85025 COMPLETE CBC W/AUTO DIFF WBC: CPT

## 2022-12-03 PROCEDURE — 2580000003 HC RX 258: Performed by: HOSPITALIST

## 2022-12-03 PROCEDURE — 6360000002 HC RX W HCPCS: Performed by: HOSPITALIST

## 2022-12-03 PROCEDURE — 2060000000 HC ICU INTERMEDIATE R&B

## 2022-12-03 PROCEDURE — 82330 ASSAY OF CALCIUM: CPT

## 2022-12-03 PROCEDURE — 93010 ELECTROCARDIOGRAM REPORT: CPT | Performed by: INTERNAL MEDICINE

## 2022-12-03 PROCEDURE — 87506 IADNA-DNA/RNA PROBE TQ 6-11: CPT

## 2022-12-03 RX ADMIN — PIPERACILLIN AND TAZOBACTAM 3375 MG: 3; .375 INJECTION, POWDER, FOR SOLUTION INTRAVENOUS at 04:16

## 2022-12-03 RX ADMIN — SODIUM CHLORIDE, PRESERVATIVE FREE 10 ML: 5 INJECTION INTRAVENOUS at 08:19

## 2022-12-03 RX ADMIN — CHLORDIAZEPOXIDE HYDROCHLORIDE 10 MG: 5 CAPSULE ORAL at 08:18

## 2022-12-03 RX ADMIN — LISINOPRIL 20 MG: 20 TABLET ORAL at 08:18

## 2022-12-03 RX ADMIN — Medication 1 TABLET: at 08:18

## 2022-12-03 RX ADMIN — PANTOPRAZOLE SODIUM 40 MG: 40 INJECTION, POWDER, FOR SOLUTION INTRAVENOUS at 20:17

## 2022-12-03 RX ADMIN — ACETAMINOPHEN 650 MG: 325 TABLET ORAL at 09:35

## 2022-12-03 RX ADMIN — VENLAFAXINE HYDROCHLORIDE 225 MG: 37.5 CAPSULE, EXTENDED RELEASE ORAL at 08:18

## 2022-12-03 RX ADMIN — Medication 100 MG: at 08:18

## 2022-12-03 RX ADMIN — PIPERACILLIN AND TAZOBACTAM 3375 MG: 3; .375 INJECTION, POWDER, FOR SOLUTION INTRAVENOUS at 20:27

## 2022-12-03 RX ADMIN — PIPERACILLIN AND TAZOBACTAM 3375 MG: 3; .375 INJECTION, POWDER, FOR SOLUTION INTRAVENOUS at 13:11

## 2022-12-03 RX ADMIN — PANTOPRAZOLE SODIUM 40 MG: 40 INJECTION, POWDER, FOR SOLUTION INTRAVENOUS at 08:18

## 2022-12-03 RX ADMIN — ACETAMINOPHEN 650 MG: 325 TABLET ORAL at 20:53

## 2022-12-03 RX ADMIN — SODIUM CHLORIDE, PRESERVATIVE FREE 10 ML: 5 INJECTION INTRAVENOUS at 20:17

## 2022-12-03 RX ADMIN — SODIUM CHLORIDE, PRESERVATIVE FREE 10 ML: 5 INJECTION INTRAVENOUS at 08:43

## 2022-12-03 ASSESSMENT — PAIN DESCRIPTION - LOCATION: LOCATION: HEAD

## 2022-12-03 ASSESSMENT — PAIN SCALES - GENERAL
PAINLEVEL_OUTOF10: 0
PAINLEVEL_OUTOF10: 2
PAINLEVEL_OUTOF10: 0

## 2022-12-03 NOTE — CONSULTS
Department of General Surgery Consult    PATIENT NAME: Kulwant Mancuso   YOB: 1970    ADMISSION DATE: 12/2/2022  2:57 PM      TODAY'S DATE: 12/3/2022    Reason for Consult:  colitis    Chief Complaint: abd pain    Requesting Physician:  Zandra Burton    HISTORY OF PRESENT ILLNESS:              The patient is a 45 y.o. male who presents with abdominal pain and bloody diarrhea. Lasted for about a day. Pain intermitted. Reports history of alcoholism. No nausea or emesis. No fevers. Pain resolved today.     Past Medical History:        Diagnosis Date    Depression     Hyperlipidemia     Hypertension     PTSD (post-traumatic stress disorder)        Past Surgical History:        Procedure Laterality Date    CARDIAC CATHETERIZATION  03/07/2017    normal cath Dr Mariya Shi       Current Medications:   Current Facility-Administered Medications: sodium chloride flush 0.9 % injection 10 mL, 10 mL, IntraVENous, 2 times per day  sodium chloride flush 0.9 % injection 10 mL, 10 mL, IntraVENous, PRN  0.9 % sodium chloride infusion, , IntraVENous, PRN  thiamine tablet 100 mg, 100 mg, Oral, Daily  therapeutic multivitamin-minerals 1 tablet, 1 tablet, Oral, Daily  labetalol (NORMODYNE;TRANDATE) injection 5 mg, 5 mg, IntraVENous, Q4H PRN  sodium chloride flush 0.9 % injection 5-40 mL, 5-40 mL, IntraVENous, 2 times per day  sodium chloride flush 0.9 % injection 5-40 mL, 5-40 mL, IntraVENous, PRN  0.9 % sodium chloride infusion, , IntraVENous, PRN  acetaminophen (TYLENOL) tablet 650 mg, 650 mg, Oral, Q6H PRN **OR** acetaminophen (TYLENOL) suppository 650 mg, 650 mg, Rectal, Q6H PRN  venlafaxine (EFFEXOR XR) extended release capsule 225 mg, 225 mg, Oral, Daily with breakfast  lisinopril (PRINIVIL;ZESTRIL) tablet 20 mg, 20 mg, Oral, Daily  pantoprazole (PROTONIX) injection 40 mg, 40 mg, IntraVENous, BID  [COMPLETED] piperacillin-tazobactam (ZOSYN) 4,500 mg in dextrose 5 % 100 mL IVPB (mini-bag), 4,500 mg, IntraVENous, Once **AND** piperacillin-tazobactam (ZOSYN) 3,375 mg in dextrose 5 % 50 mL IVPB (mini-bag), 3,375 mg, IntraVENous, Q8H  Prior to Admission medications    Medication Sig Start Date End Date Taking?  Authorizing Provider   atorvastatin (LIPITOR) 40 MG tablet TAKE ONE-HALF TABLET BY MOUTH AT BEDTIME FOR CHOLESTEROL **NOTE DIRECTIONS**    Historical Provider, MD   fluticasone (FLONASE) 50 MCG/ACT nasal spray USE 2 SPRAYS IN EACH NOSTRIL ONCE DAILY FOR NASAL ALLERGY    Historical Provider, MD   lisinopril-hydrochlorothiazide (PRINZIDE;ZESTORETIC) 20-12.5 MG per tablet TAKE 1 TABLET BY MOUTH ONCE DAILY FOR BLOOD PRESSURE    Historical Provider, MD   buPROPion (WELLBUTRIN SR) 150 MG extended release tablet TAKE ONE TABLET BY MOUTH TWICE A DAY    Historical Provider, MD   venlafaxine (EFFEXOR XR) 75 MG extended release capsule Take 3 capsules by mouth daily (with breakfast)  Patient not taking: Reported on 12/2/2022 3/9/17   Fahad Redding MD   clonazePAM (KLONOPIN) 2 MG tablet Take 2 mg by mouth 2 times daily as needed    Historical Provider, MD        Allergies:  Bactrim [sulfamethoxazole-trimethoprim]    Social History:   Social History     Socioeconomic History    Marital status: Single     Spouse name: Not on file    Number of children: Not on file    Years of education: Not on file    Highest education level: Not on file   Occupational History    Not on file   Tobacco Use    Smoking status: Some Days     Packs/day: 0.50     Types: Cigarettes    Smokeless tobacco: Never   Substance and Sexual Activity    Alcohol use: No    Drug use: No    Sexual activity: Not on file   Other Topics Concern    Not on file   Social History Narrative    Not on file     Social Determinants of Health     Financial Resource Strain: Not on file   Food Insecurity: Not on file   Transportation Needs: Not on file   Physical Activity: Not on file   Stress: Not on file   Social Connections: Not on file   Intimate Partner Violence: Not on file Housing Stability: Not on file         Family History:    History reviewed. No pertinent family history. REVIEW OF SYSTEMS:  CONSTITUTIONAL:  negative  HEENT:  negative  RESPIRATORY:  negative  CARDIOVASCULAR:  negative  GASTROINTESTINAL:  negative except for diarrhea, abdominal pain, and hemtochezia  GENITOURINARY:  negative  HEMATOLOGIC/LYMPHATIC:  negative  NEUROLOGICAL:  Negative  * All other ROS reviewed and negative. PHYSICAL EXAM:  VITALS:  /88   Pulse 83   Temp 98.1 °F (36.7 °C) (Oral)   Resp 16   Ht 6' 1\" (1.854 m)   Wt 198 lb 6.6 oz (90 kg)   SpO2 93%   BMI 26.18 kg/m²   24HR INTAKE/OUTPUT:    I/O last 3 completed shifts: In: 300 [P.O.:300]  Out: -   No intake/output data recorded. CONSTITUTIONAL:  alert, no apparent distress and normal weight  EYES:  PERRL, sclera clear  ENT:  Normocephalic,atraumatic, without obvious abnormality  NECK:  supple, symmetrical, trachea midline  LUNGS: Resp effort easy and unlabored, no crackles or wheezing  CARDIOVASCULAR:  NO JVD, regular rate  ABDOMEN:  , normal bowel sounds, soft, non-distended, non-tender,   MUSCULOSKELETAL: No clubbing or cyanosis, 0+ pitting edema lower extremities  NEUROLOGIC:  Mental Status Exam:  Level of Alertness:   awake  PSYCHIATRIC:   person, place, time  SKIN:  no jaundice    DATA:    CBC:   Recent Labs     12/02/22  1622 12/03/22 0327 12/03/22  0838   WBC 14.2* 8.8  --    HGB 15.1 13.9 14.7   HCT 44.7 40.3* 43.2    148  --      BMP:    Recent Labs     12/02/22  1622 12/03/22  0327   * 133*   K 3.3* 3.8   CL 95* 97*   CO2 26 29   BUN 21* 15   CREATININE 1.4* 1.1   GLUCOSE 96 131*     Hepatic:   Recent Labs     12/02/22  1622 12/03/22  0327   AST 61* 42*   ALT 64* 49*   BILITOT 1.1* 1.8*   ALKPHOS 85 92     Mag:      Recent Labs     12/02/22  1622 12/02/22  1909   MG 1.50* 1.70*      Phos:   No results for input(s): PHOS in the last 72 hours.    INR:   Recent Labs     12/02/22  1702   INR 0.96 Radiology Review: Images personally reviewed by me.    CT - colitis of splenic flexure, no pneumatosis, no perforation      IMPRESSION/RECOMMENDATIONS:    44 yo with colitis  Symptoms improved with no pain today  Continue IVF resuscitation  If this represents ischemia as cause then reversible based on exam and labs  Ok to start advancing diet  Will sign off but call if worsens    Electronically signed by Stacey eWems MD     [address] Surgery  [id number]

## 2022-12-03 NOTE — CONSULTS
Consultation Note    Patient Name: Yi Gabriel  : 1970  Age: 45 y.o. Admitting Physician: Felix Galo MD   Date of Admission: 2022  2:57 PM   Primary Care Physician: No primary care provider on file. Yi Gabriel is being seen at the request of Felix Galo MD for rectal bleeding. History of Present Illness:  44 yo w/ PMHx significant for depression, HDL, HTN, PTSD. No prior abdominal surgeries noted. Patient presents w/ rectal bleeding. Reports abdominal cramping in the lower abdomen bilaterally and diarrhea ongoing for past 3 days with  sudden onset of BRBPR with clots starting yesterday. Denies prior history of rectal bleeding. No prior colonoscopy, has submitted FIT testing for the last 3 years which he reports has been negative. History of alcohol abuse with ingestion of as many as 24 beers per day. Was drinking over the past week. States he has not had any further rectal bleeding or bowel movements today. On admission, CT AP  revealing evidence of acute colitis to splenic flexure and descending colon. Reports of simple 12 mm cyst anterior segment IV Liver, otherwise liver reported as unremarkable. Sodium 135; Potassium 3.3, Creatinine 1.4 improved to 1.1 today, Magnesium 1.5 rechecked 1.7; Lactic Acid 2.1 Improved from 3.7 on admission, Ammonia ; Alk Phos 85; ALT 64; AST 61; T. Bili 1.1 presently 1.8; WBC 14.2 rechecked 8.8; Hgb 14.7; INR 0.96; albumin 4.5. Past Medical History:  Past Medical History:   Diagnosis Date    Depression     Hyperlipidemia     Hypertension     PTSD (post-traumatic stress disorder)         Past Surgical History:  Past Surgical History:   Procedure Laterality Date    CARDIAC CATHETERIZATION  2017    normal cath Dr Germán Caraballo        Historical Medications:  Prior to Visit Medications    Medication Sig Taking?  Authorizing Provider   atorvastatin (LIPITOR) 40 MG tablet TAKE ONE-HALF TABLET BY MOUTH AT BEDTIME FOR CHOLESTEROL **NOTE DIRECTIONS**  Historical Provider, MD   fluticasone (FLONASE) 50 MCG/ACT nasal spray USE 2 SPRAYS IN EACH NOSTRIL ONCE DAILY FOR NASAL ALLERGY  Historical Provider, MD   lisinopril-hydrochlorothiazide (PRINZIDE;ZESTORETIC) 20-12.5 MG per tablet TAKE 1 TABLET BY MOUTH ONCE DAILY FOR BLOOD PRESSURE  Historical Provider, MD   buPROPion (WELLBUTRIN SR) 150 MG extended release tablet TAKE ONE TABLET BY MOUTH TWICE A DAY  Historical Provider, MD   venlafaxine (EFFEXOR XR) 75 MG extended release capsule Take 3 capsules by mouth daily (with breakfast)  Patient not taking: Reported on 12/2/2022  Dulce Dupont MD   clonazePAM (KLONOPIN) 2 MG tablet Take 2 mg by mouth 2 times daily as needed  Historical Provider, MD        Hospital Medications:  Current Facility-Administered Medications: sodium chloride flush 0.9 % injection 10 mL, 10 mL, IntraVENous, 2 times per day  sodium chloride flush 0.9 % injection 10 mL, 10 mL, IntraVENous, PRN  0.9 % sodium chloride infusion, , IntraVENous, PRN  thiamine tablet 100 mg, 100 mg, Oral, Daily  therapeutic multivitamin-minerals 1 tablet, 1 tablet, Oral, Daily  labetalol (NORMODYNE;TRANDATE) injection 5 mg, 5 mg, IntraVENous, Q4H PRN  sodium chloride flush 0.9 % injection 5-40 mL, 5-40 mL, IntraVENous, 2 times per day  sodium chloride flush 0.9 % injection 5-40 mL, 5-40 mL, IntraVENous, PRN  0.9 % sodium chloride infusion, , IntraVENous, PRN  acetaminophen (TYLENOL) tablet 650 mg, 650 mg, Oral, Q6H PRN **OR** acetaminophen (TYLENOL) suppository 650 mg, 650 mg, Rectal, Q6H PRN  venlafaxine (EFFEXOR XR) extended release capsule 225 mg, 225 mg, Oral, Daily with breakfast  lisinopril (PRINIVIL;ZESTRIL) tablet 20 mg, 20 mg, Oral, Daily  pantoprazole (PROTONIX) injection 40 mg, 40 mg, IntraVENous, BID  [COMPLETED] piperacillin-tazobactam (ZOSYN) 4,500 mg in dextrose 5 % 100 mL IVPB (mini-bag), 4,500 mg, IntraVENous, Once **AND** piperacillin-tazobactam (ZOSYN) 3,375 mg in dextrose 5 % 50 mL IVPB (mini-bag), 3,375 mg, IntraVENous, Q8H     Social History:   Social History       Tobacco History       Smoking Status  Some Days Smoking Frequency  0.50 packs/day Smoking Tobacco Type  Cigarettes      Smokeless Tobacco Use  Never              Alcohol History       Alcohol Use Status  No              Drug Use       Drug Use Status  No              Sexual Activity       Sexually Active  Not Asked                     Family History:  History reviewed. No pertinent family history. Allergies: Allergies   Allergen Reactions    Bactrim [Sulfamethoxazole-Trimethoprim]         ROS:   General: No fever or weight change  Hematologic: No unexpected submucosal bleeding or bruising  HEENT: No sore throat or facial pain  Respiratory: No cough or dyspnea  Cardiovascular: No angina or dependent edema  Gastrointestinal: See HPI  Musculoskeletal: No usual joint pain or stiffness  Skin: No skin eruptions or changing lesions  Neurologic: No focal weakness or numbness  Psychiatric: No anxiety or sleep disturbance    Physical Exam:  Vital Signs:   Vitals:    12/03/22 1700   BP: 121/78   Pulse: 82   Resp: 16   Temp: 98.2 °F (36.8 °C)   SpO2: 96%       General: Well-nourished, well-developed. Anxious. HEENT: Sclera anicteric, mucosal membranes moist  Cardiovascular: Regular rate and rhythm. No murmurs. Respiratory: Respirations nonlabored, no crepitus  GI: Abdomen nondistended, soft, and nontender. Normal active bowel sounds. No masses palpable. Rectal: Deferred  Musculoskeletal: No pitting edema of the lower legs. Neurological: Gross memory appears intact. Patient is alert and oriented. Tremulous.        Recent Imaging:   CT ABDOMEN PELVIS W IV CONTRAST Additional Contrast? None  Narrative: EXAMINATION:  CT OF THE ABDOMEN AND PELVIS WITH CONTRAST 12/2/2022 5:18 pm    TECHNIQUE:  CT of the abdomen and pelvis was performed with the administration of  intravenous contrast. Multiplanar reformatted images are provided for review. Automated exposure control, iterative reconstruction, and/or weight based  adjustment of the mA/kV was utilized to reduce the radiation dose to as low  as reasonably achievable. COMPARISON:  None. HISTORY:  ORDERING SYSTEM PROVIDED HISTORY: Abdominal pain, blood in stool    Decision Support Exception - unselect if not a suspected or confirmed  emergency medical condition->Emergency Medical Condition (MA)  Reason for Exam: blood in stool today    FINDINGS:  Lower Chest: Visualized portions of the lungs are clear. Cardiac and  posterior mediastinal structures visualized are unremarkable. Organs: Simple 12 mm cyst anterior segment IV, -6 Hounsfield units axial  series 2 image series 2 (benign finding requiring no additional evaluation or  follow-up). Other portions of the liver appear unremarkable. No  intrahepatic or extrahepatic bile duct dilatation demonstrated. Unremarkable  appearance of the gallbladder, kidneys, adrenal glands, pancreas and spleen. GI/Bowel: The stomach and small bowel appear unremarkable. No diffuse or  focal small bowel wall thickening or inflammatory changes evident. No  obstruction is seen. The appendix is visualized right lower quadrant,  unremarkable in appearance. Mild wall thickening and pericolonic  inflammatory changes begin at the splenic flexure and extend to the junction  of the descending and sigmoid colon. The colon appears otherwise  unremarkable. Pelvis: Prostate gland and seminal vesicles appear unremarkable. Urinary  bladder is partially filled, unremarkable appearance. No adenopathy or free  fluid. Peritoneum/Retroperitoneum: Mild calcific atherosclerotic disease aorta. No  aneurysm. Unremarkable appearance of the IVC. No adenopathy or fluid. No  pneumoperitoneum.   Patent appearance of the celiac axis, superior mesenteric  artery and branches, inferior mesenteric artery, superior mesenteric vein,  splenic vein and portal vein.    Bones/Soft Tissues: No acute superficial soft tissue or osseous structure  abnormality evident. Impression: 1. Acute colitis splenic flexure and descending colon, typical distribution  for ischemic colitis, though infection or inflammatory processes are a  consideration well. Splanchnic vasculature appears patent. 2. Mild calcific atherosclerosis aorta and branches. 3.  No findings to suggest acute appendicitis; no ureter calculus or  hydronephrosis. XR CHEST (2 VW)  Narrative: EXAMINATION:  TWO XRAY VIEWS OF THE CHEST    12/2/2022 4:25 pm    COMPARISON:  None. HISTORY:  ORDERING SYSTEM PROVIDED HISTORY: abdominal pain    FINDINGS:  The cardiomediastinal and hilar silhouettes appear unremarkable. Hazy  opacity at the left cardiac apex margin partially obscures the silhouette at  this level. The right lung appears clear. No pleural effusion evident. No  pneumothorax is seen. No acute osseous abnormality is identified. Impression: Hazy opacity lingula left upper lobe may reflect focal pneumonia or  subsegmental atelectasis. Chest radiograph surveillance recommended at 4-6  weeks to ensure clearing. Labs:   Recent Labs     12/02/22  1622 12/03/22  0327 12/03/22  0838 12/03/22  1455   HGB 15.1 13.9 14.7 13.4*   WBC 14.2* 8.8  --   --    PROT 6.9 5.6*  --   --    LABALBU 4.5 3.5  --   --    ALKPHOS 85 92  --   --    ALT 64* 49*  --   --    AST 61* 42*  --   --    BILITOT 1.1* 1.8*  --   --         Assessment:  46 yo w/ PMHx significant for Depression, HDL, HTN, PTSD. No prior abdominal surgeries noted. Patient presented w/ rectal bleeding and lower abdominal pain becoming worse in the left abdomen after a few days of non-bloody diarrhea. CT imaging revealing mild wall thickening and pericolonic inflammatory changes in left colonic watershed region consistent with ischemic colitis. Rectal bleeding and diarrhea now resolved. WBC improved, Hgb reassuring at 14.7 this morning.   No abdominal pain on exam today. Presentation most consistent with small vessel ischemic colitis, possible induced by volume depletion from alcohol abuse. Infectious colitis as an alternative consideration. Plan:  Continue clear liquid diet for now   Agree with antibiotics, currently Zosyn   Serial H/H   Check GI PCR bacterial pathogen panel   Plan for colonoscopy Monday to confirm diagnosis. Agree with treatment of alcohol withdrawal.  Discussed cessation. Elevated LFTs likely related to mild alcoholic hepatitis. Repeat tomorrow for trend. Layla Garrett MD, MD    [address]    [phone].  Also available via [employer]

## 2022-12-03 NOTE — PROGRESS NOTES
Hospitalist Progress Note      PCP: No primary care provider on file. Date of Admission: 12/2/2022    Chief Complaint: rectal bleeding    Hospital Course:   Yovana Rivers is a 45 y.o. male who presented to the ED to be evaluated for several episodes of BRBPR occurring earlier this morning PTA. He reports that he has been experiencing crampy abdominal pain for several days, and that today he began to pass fresh blood with large clots. He states that he called 911 after experiencing a panic attack due to the amount of blood present in his toilet bowl. Patient denies underlying history of IBD or hemorrhoids. Upon further questioning, patient endorses a decades long history of EtOH abuse. He reports that he binge drinks 3-4 times weekly and the amount of 24+ beers. He reports that he has never gone to rehab or attempted to detox. Patient denies any previous seizure activity. He does smoke tobacco but denies illicit drug use. Upon arrival to the ED EKG was obtained revealing NSR with AV block. STAT CXR reveals hazy opacification of left lingula suggestive of focal pneumonia. CT scan of the abdomen and pelvis demonstrates acute colitis from splenic flexure to descending colon, concerning for ischemic colitis; infectious or inflammatory process is also possible. Splanchnic vasculature appears patent. Patient with multiple lab abnormalities including: Left shifted leukocytosis 14.2, H/H 15.1/44.7, lactate 3.7, transaminitis, hypokalemia 3.3, hypomagnesemia 1.5, hyponatremia 135, and JEN with BUNs/CR 21/1.4. Patient was initiated on large-volume IV resuscitation per ED provider due to concerns for acute sepsis. Following collection of blood cultures he received a one-time dosage of Zosyn. Upon hospitalist arrival to bedside, patient was in obvious alcohol withdrawal with CIWA score of 26; IV Ativan administered promptly to avoid any further complications.   Consult also made to GI due to concerns for possible portal hypertension with variceal bleed, in case overnight band ligation becomes necessary. Subjective: abdominal pain improved. No bleeding noted since admission. Medications:  Reviewed    Infusion Medications    sodium chloride      sodium chloride       Scheduled Medications    sodium chloride flush  10 mL IntraVENous 2 times per day    thiamine  100 mg Oral Daily    multivitamin  1 tablet Oral Daily    sodium chloride flush  5-40 mL IntraVENous 2 times per day    venlafaxine  225 mg Oral Daily with breakfast    lisinopril  20 mg Oral Daily    chlordiazePOXIDE  10 mg Oral TID    pantoprazole  40 mg IntraVENous BID    piperacillin-tazobactam  3,375 mg IntraVENous Q8H     PRN Meds: sodium chloride flush, sodium chloride, LORazepam **OR** LORazepam **OR** LORazepam **OR** LORazepam **OR** LORazepam **OR** LORazepam **OR** LORazepam **OR** LORazepam, labetalol, sodium chloride flush, sodium chloride, acetaminophen **OR** acetaminophen      Intake/Output Summary (Last 24 hours) at 12/3/2022 1107  Last data filed at 12/2/2022 2238  Gross per 24 hour   Intake 300 ml   Output --   Net 300 ml       Physical Exam Performed:    BP (!) 141/80   Pulse 74   Temp 98.2 °F (36.8 °C) (Oral)   Resp 16   Ht 6' 1\" (1.854 m)   Wt 198 lb 6.6 oz (90 kg)   SpO2 98%   BMI 26.18 kg/m²     General appearance: No apparent distress, appears stated age and cooperative. HEENT: Pupils equal, round, and reactive to light. Conjunctivae/corneas clear. Neck: Supple, with full range of motion. No jugular venous distention. Trachea midline. Respiratory:  Normal respiratory effort. Clear to auscultation, bilaterally without Rales/Wheezes/Rhonchi. Cardiovascular: tachycardia, regular rhythm with normal S1/S2 without murmurs, rubs or gallops. Abdomen: Soft, mild tenderness, non-distended with normal bowel sounds. Musculoskeletal: No clubbing, cyanosis or edema bilaterally. Full range of motion without deformity.   Skin: Skin color, texture, turgor normal.  No rashes or lesions. Neurologic:  Neurovascularly intact without any focal sensory/motor deficits. Cranial nerves: II-XII intact, grossly non-focal.  Psychiatric: Alert and oriented, thought content appropriate, normal insight  Capillary Refill: Brisk, 3 seconds, normal   Peripheral Pulses: +2 palpable, equal bilaterally       Labs:   Recent Labs     12/02/22  1622 12/03/22  0327 12/03/22  0838   WBC 14.2* 8.8  --    HGB 15.1 13.9 14.7   HCT 44.7 40.3* 43.2    148  --      Recent Labs     12/02/22  1622 12/03/22  0327   * 133*   K 3.3* 3.8   CL 95* 97*   CO2 26 29   BUN 21* 15   CREATININE 1.4* 1.1   CALCIUM 9.7 8.4     Recent Labs     12/02/22  1622 12/03/22  0327   AST 61* 42*   ALT 64* 49*   BILITOT 1.1* 1.8*   ALKPHOS 85 92     Recent Labs     12/02/22  1702   INR 0.96     Recent Labs     12/02/22  1622 12/02/22  2253   CKTOTAL  --  219   TROPONINI <0.01  --        Urinalysis:      Lab Results   Component Value Date/Time    NITRU Negative 12/02/2022 06:20 PM    BLOODU Negative 12/02/2022 06:20 PM    SPECGRAV 1.010 12/02/2022 06:20 PM    GLUCOSEU Negative 12/02/2022 06:20 PM       Radiology:  CT ABDOMEN PELVIS W IV CONTRAST Additional Contrast? None   Final Result   1. Acute colitis splenic flexure and descending colon, typical distribution   for ischemic colitis, though infection or inflammatory processes are a   consideration well. Splanchnic vasculature appears patent. 2. Mild calcific atherosclerosis aorta and branches. 3.  No findings to suggest acute appendicitis; no ureter calculus or   hydronephrosis. XR CHEST (2 VW)   Final Result   Hazy opacity lingula left upper lobe may reflect focal pneumonia or   subsegmental atelectasis. Chest radiograph surveillance recommended at 4-6   weeks to ensure clearing.              IP CONSULT TO GENERAL SURGERY  IP CONSULT TO SOCIAL WORK  IP CONSULT TO GI    Assessment/Plan:    Active Hospital Problems Diagnosis     Sepsis ([facility] [A41.9]      Priority: Medium    Alcohol withdrawal syndrome with complication ([facility] [W38.667]      Priority: Medium    Hypomagnesemia [E83.42]      Priority: Medium    Acute lower GI hemorrhage [K92.2]      Priority: Medium    Tobacco abuse [Z72.0]      Priority: Medium    Panic disorder [F41.0]     PTSD (post-traumatic stress disorder) [F43.10]     Essential hypertension [I10]      GI Bleed  - 2/2 colitis  - low concern for ischemia based on exam  - general surgery consulted  - GI consulted  - H/H stable    Sepsis 2/2 Colitis  - presented with tachycardia, leukocytosis. Lactate 3.7 on admission  - s/p IVF  - likely infectious colitis, less likely ischemic  - continue zosyn  - GI consulted    Alcohol dependence  - no evidence of withdrawal  - counseling given  - vitamins ordered    HTN  - poorly controlled  - continue lisinopril. Holding HCTZ    HLD  - holding statin for now    Depression  - mood stable  - continue home effexor    DVT Prophylaxis: SCDs  Diet: ADULT DIET;  Clear Liquid  Code Status: Full Code  PT/OT Eval Status: not ordered    Dispo - likely home tomorrow    Appropriate for A1 Discharge Unit: No      Claude Bitters, MD

## 2022-12-03 NOTE — PLAN OF CARE
Problem: Discharge Planning  Goal: Discharge to home or other facility with appropriate resources  Outcome: Progressing  Flowsheets (Taken 12/3/2022 0818)  Discharge to home or other facility with appropriate resources: Identify barriers to discharge with patient and caregiver     Problem: Pain  Goal: Verbalizes/displays adequate comfort level or baseline comfort level  Outcome: Progressing     Problem: Safety - Adult  Goal: Free from fall injury  Outcome: Progressing

## 2022-12-03 NOTE — H&P
HOSPITALISTS HISTORY AND PHYSICAL    12/2/2022 8:49 PM    Patient Information:  Brielle Joyner is a 45 y.o. male [id number]  PCP:  No primary care provider on file. (Tel: None )    Chief complaint:    Chief Complaint   Patient presents with    Rectal Bleeding     Patient had multiple bowel movements and has blood on toilet paper. Has had solid and loose stools. When he saw the blood he had an anxiety attack and called 911. History of Present Illness:  Reyes Jasper is a 45 y.o. male who presented to the ED to be evaluated for several episodes of BRBPR occurring earlier this morning PTA. He reports that he has been experiencing crampy abdominal pain for several days, and that today he began to pass fresh blood with large clots. He states that he called 911 after experiencing a panic attack due to the amount of blood present in his toilet bowl. Patient denies underlying history of IBD or hemorrhoids. Upon further questioning, patient endorses a decades long history of EtOH abuse. He reports that he binge drinks 3-4 times weekly and the amount of 24+ beers. He reports that he has never gone to rehab or attempted to detox. Patient denies any previous seizure activity. He does smoke tobacco but denies illicit drug use. Upon arrival to the ED EKG was obtained revealing NSR with AV block. STAT CXR reveals hazy opacification of left lingula suggestive of focal pneumonia. CT scan of the abdomen and pelvis demonstrates acute colitis from splenic flexure to descending colon, concerning for ischemic colitis; infectious or inflammatory process is also possible. Splanchnic vasculature appears patent. Patient with multiple lab abnormalities including: Left shifted leukocytosis 14.2, H/H 15.1/44.7, lactate 3.7, transaminitis, hypokalemia 3.3, hypomagnesemia 1.5, hyponatremia 135, and JEN with BUNs/CR 21/1.4. Patient was initiated on large-volume IV resuscitation per ED provider due to concerns for acute sepsis. Following collection of blood cultures he received a one-time dosage of Zosyn. Upon hospitalist arrival to bedside, patient was in obvious alcohol withdrawal with CIWA score of 26; IV Ativan administered promptly to avoid any further complications. Consult also made to GI due to concerns for possible portal hypertension with variceal bleed, in case overnight band ligation becomes necessary. History obtained from patient and review of Epic chart     REVIEW OF SYSTEMS:   Constitutional: Negative for fever,chills; positive generalized weakness  ENT: Negative for headache, rhinorrhea, and sore throat. Respiratory: Negative for shortness of breath, wheezing, and cough  Cardiovascular: Negative for chest pain, palpitations, peripheral edema, orthopnea or PND  Gastrointestinal: Several episodes of BRBPR with clots; diffuse crampy abdominal pain with diarrhea; positive nausea without vomiting; no hematemesis or melena; no anorexia  Genitourinary: Negative for dysuria, frequency, retention; no incontinence  Hematologic/Lymphatic: Negative for bleeding tendency/excessive bruising  Musculoskeletal: Negative for myalgias and arthalgias; able to ambulate without difficulty  Neurologic: Negative for LOC, seizure activity, paresthesias, dysarthria, vertigo, and gait disturbance  Skin: Negative for itching,rash, decubitus  Psychiatric: Decades long history of EtOH abuse; acute panic attack; denies depression; no hallucinations; denies SI/HI  Endocrine: Negative for polyuria/polydipsia/polyphagia; no heat/cold intolerance    Past Medical History:   has a past medical history of Depression, Hyperlipidemia, Hypertension, and PTSD (post-traumatic stress disorder). Past Surgical History:   has a past surgical history that includes Cardiac catheterization (03/07/2017).      Medications:  No current facility-administered medications on file prior to encounter. Current Outpatient Medications on File Prior to Encounter   Medication Sig Dispense Refill    atorvastatin (LIPITOR) 40 MG tablet TAKE ONE-HALF TABLET BY MOUTH AT BEDTIME FOR CHOLESTEROL **NOTE DIRECTIONS**      fluticasone (FLONASE) 50 MCG/ACT nasal spray USE 2 SPRAYS IN EACH NOSTRIL ONCE DAILY FOR NASAL ALLERGY      lisinopril-hydrochlorothiazide (PRINZIDE;ZESTORETIC) 20-12.5 MG per tablet TAKE 1 TABLET BY MOUTH ONCE DAILY FOR BLOOD PRESSURE      buPROPion (WELLBUTRIN SR) 150 MG extended release tablet TAKE ONE TABLET BY MOUTH TWICE A DAY      venlafaxine (EFFEXOR XR) 75 MG extended release capsule Take 3 capsules by mouth daily (with breakfast) 30 capsule 0    clonazePAM (KLONOPIN) 2 MG tablet Take 2 mg by mouth 2 times daily as needed         Allergies: Allergies   Allergen Reactions    Bactrim [Sulfamethoxazole-Trimethoprim]         Social History:   reports that he has been smoking. He has been smoking an average of .5 packs per day. He has never used smokeless tobacco. He reports that he does not drink alcohol and does not use drugs. Family History:  family history is not on file. Physical Exam:  BP (!) 133/90   Pulse 94   Temp 98.8 °F (37.1 °C) (Oral)   Resp 20   Ht 6' 1\" (1.854 m)   Wt 195 lb (88.5 kg)   SpO2 95%   BMI 25.73 kg/m²     General appearance: Anxious middle-aged male with tremulousness, diaphoresis, tachypnea, and tachycardia concerning for active EtOH withdrawal  Eyes: Sclera clear without conjunctival injection; PERRLA; EOMI  ENT: Mucous membranes moist without thrush; normal dentition  Neck: Supple without meningismus; no goiter; no carotid bruit bilaterally  Cardiovascular: Tachycardic rhythm without ectopy; normal S1-S2 with no murmurs; no peripheral edema; no JVD  Respiratory:  Moderate tachypnea; CTAB with adequate air exchange, no wheeze, rhonchi or rales; I:E intact  Gastrointestinal: Abdomen soft and diffusely tender; bowel sounds normal; no masses/organomegaly appreciated  Musculoskeletal: FROM spine and extremities x4; no gross deformity  Neurology: Postural tremulousness apparent without seizure activity; A&O x3; cranial nerves 2-12 grossly intact; motor 5/5  BUE/BLE  Psychiatry: Acute encephalopathy with severe anxiety  Skin: Warm, dry, normal turgor, no rash  PV: 2/4 radial and dorsalis pedis bilaterally; brisk capillary refill    Labs:  CBC:   Lab Results   Component Value Date/Time    WBC 14.2 12/02/2022 04:22 PM    RBC 4.71 12/02/2022 04:22 PM    HGB 15.1 12/02/2022 04:22 PM    HCT 44.7 12/02/2022 04:22 PM    MCV 94.9 12/02/2022 04:22 PM    MCH 32.0 12/02/2022 04:22 PM    MCHC 33.7 12/02/2022 04:22 PM    RDW 13.6 12/02/2022 04:22 PM     12/02/2022 04:22 PM    MPV 8.2 12/02/2022 04:22 PM     BMP:    Lab Results   Component Value Date/Time     12/02/2022 04:22 PM    K 3.3 12/02/2022 04:22 PM    CL 95 12/02/2022 04:22 PM    CO2 26 12/02/2022 04:22 PM    BUN 21 12/02/2022 04:22 PM    CREATININE 1.4 12/02/2022 04:22 PM    CALCIUM 9.7 12/02/2022 04:22 PM    GFRAA >60 06/22/2021 08:53 PM    LABGLOM 60 12/02/2022 04:22 PM    GLUCOSE 96 12/02/2022 04:22 PM     CT ABDOMEN PELVIS W IV CONTRAST Additional Contrast? None   Final Result   1. Acute colitis splenic flexure and descending colon, typical distribution   for ischemic colitis, though infection or inflammatory processes are a   consideration well. Splanchnic vasculature appears patent. 2. Mild calcific atherosclerosis aorta and branches. 3.  No findings to suggest acute appendicitis; no ureter calculus or   hydronephrosis. XR CHEST (2 VW)   Final Result   Hazy opacity lingula left upper lobe may reflect focal pneumonia or   subsegmental atelectasis. Chest radiograph surveillance recommended at 4-6   weeks to ensure clearing. EKG:      Ventricular Rate 96 P BPM QTc Calculation (Bazett) 429 P ms   Atrial Rate 96 P BPM P Axis 8 P degrees   P-R Interval 212 P ms R Axis 3 P degrees   QRS Duration 94 P ms T Axis -3 P degrees   Q-T Interval 340 P ms Diagnosis Sinus rhythm with 1st degree A-V blockOtherwise normal         I visualized CXR images and EKG strips personally and agree with documented interpretation    Discussed case  with ED provider    Problem List:  Principal Problem:    Acute lower GI hemorrhage  Active Problems:    Alcohol withdrawal syndrome with complication (HCC)    Sepsis (HCC)    Hypomagnesemia    Tobacco abuse    Essential hypertension    PTSD (post-traumatic stress disorder)    Panic disorder  Resolved Problems:    * No resolved hospital problems.  *        Consults:  IP CONSULT TO GENERAL SURGERY  IP CONSULT TO SOCIAL WORK  IP CONSULT TO GI      Assessment/Plan:     GI hemorrhage with BRBPR  -Admit to telemetry for continuous cardiac monitoring  -Serial H/H Q6H  -Type and screen performed with plans for PRBC transfusion for Hgb < 7  -Protonix 40 mg IV initiated BID  -Patient at risk for variceal bleed, however octreotide held in the setting of possible ischemic colitis  -Consultation placed to GI for further recommendations  -Strict NPO after midnight     Sepsis with acute colitis  -Patient admitted to telemetry floor with appropriate isolation measures in place  -Crystalloid IVF resuscitation initiated in ED, with maintenance fluids to infuse overnight; strict I's and O's  -Blood, urine ordered STAT  -Patient initiated on IV Zosyn empirically pending pathogen identification; pharmacy consulted for dosage assistance  -Serial CBC, CMP, lactate, procalcitonin to monitor treatment progression     Alcohol withdrawal  -Admit to telemetry floor with CIWA Protocol order set initiated  -Urine drug screen ordered to rule out potential cross addiction/polysubstance abuse  -Seizure and fall risk precautions in place  -Thiamine, folate, and MVI oral supplementation initiated daily  -Ativan scheduled PRN based on CIWA score  -Consultation placed to  for Rehab/ IOP/ 12-Step Meeting recommendations     Uncontrolled HTN  -Patient admitted to telemetry floor for continuous monitoring during stay  -EKG obtained in ED reviewed personally and found to be without evidence of LAD or acute ischemia  -Continue home dosage of lisinopril 20 mg daily; HCTZ on hold  -IV labetalol scheduled PRN (with parameters) to address extreme BP elevation        DVT prophylaxis-BLE SCDs only  Code status-full code  Diet-sips of clears now then n.p.o. after midnight  IV access-PIV established in ED      Admit as inpatient. I anticipate hospitalization spanning more than two midnights for investigation and treatment of the above medically necessary diagnoses. Comment: Please note this report has been produced using speech recognition software and may contain errors related to that system including errors in grammar, punctuation, and spelling, as well as words and phrases that may be inappropriate. If there are any questions or concerns please feel free to contact the dictating provider for clarification.          Sherif Hughes MD    12/2/2022 8:49 PM

## 2022-12-03 NOTE — ED NOTES
Call placed to [address] @ [phone]  Re: Ischemic colitis, concern for rectal varices in an alcoholic patient per NP Zak Colunga @ [address]  12/02/22 2004

## 2022-12-03 NOTE — ED NOTES
PS to General Surgery @ [id number]  RE: ischemic colitis with rectal bleeding on exam and ct per NP Heather Oneal @ [address]  12/02/22 1942

## 2022-12-03 NOTE — PROGRESS NOTES
Physical Therapy  Facility/Department: [address] [place], [place] [address]  Physical Therapy Initial Assessment and Discharge Summary    Name: Yovana Rivers  : 1970  MRN: [id number]  Date of Service: 12/3/2022    Discharge Recommendations:  Home independently   PT Equipment Recommendations  Equipment Needed: No      Patient Diagnosis(es): The primary encounter diagnosis was Ischemic colitis ([facility] [address] Diagnoses of Acute kidney injury ([facility] [address] Severe sepsis ([facility] [address] Hypokalemia, and Hypomagnesemia were also pertinent to this visit. Past Medical History:  has a past medical history of Depression, Hyperlipidemia, Hypertension, and PTSD (post-traumatic stress disorder). Past Surgical History:  has a past surgical history that includes Cardiac catheterization (2017). Assessment   Assessment: Patient is a 45year old male who was admitted to [facility] on 22 with acute lower GI hemorrhage and alcohol withdrawal. Patient said that all of his symptoms have resolved. Patient did not have any tremors or loss of balance/gait abnormalities during today's evaluation. Patient has returned to his prior independent level of function. Today he ambulated up to 200 feet with no assistive device and modified independence. He also ascended 4 steps with a rail and modified independence. Patient is safe for home, when medically stable. Patient has met all of his acute PT goals. No additional skilled acute PT needs. PT signing off. Treatment Diagnosis: Decreased independence with functional mobility  Specific Instructions for Next Treatment: N/A PT signing off  Therapy Prognosis: Excellent  Decision Making: Low Complexity  Barriers to Learning: none  No Skilled PT: Independent with functional mobility   Requires PT Follow-Up: No  Activity Tolerance  Activity Tolerance: Patient tolerated evaluation without incident  Activity Tolerance Comments: post activity: 96% on room air.  85 BPM.     Plan   Physcial Therapy Plan  General Plan: Discharge with evaluation only  Specific Instructions for Next Treatment: N/A PT signing off  Safety Devices  Type of Devices: Nurse notified, Left in bed, Call light within reach (RN(Kendy) cleared pt to be IND for toileting within room)     Restrictions  Restrictions/Precautions  Restrictions/Precautions: Seizure, Fall Risk, General Precautions  Position Activity Restriction  Other position/activity restrictions: IV, Tele     Subjective   Pain: Pt denies pain at rest  General  Chart Reviewed: Yes  Patient assessed for rehabilitation services?: Yes  Additional Pertinent Hx: HPI per chart, \"Navi Rollins is a 45 y.o. male who presented to the ED to be evaluated for several episodes of BRBPR occurring earlier this morning PTA. He reports that he has been experiencing crampy abdominal pain for several days, and that today he began to pass fresh blood with large clots. He states that he called 911 after experiencing a panic attack due to the amount of blood present in his toilet bowl. Patient denies underlying history of IBD or hemorrhoids. Upon further questioning, patient endorses a decades long history of EtOH abuse. He reports that he binge drinks 3-4 times weekly and the amount of 24+ beers. He reports that he has never gone to rehab or attempted to detox. Patient denies any previous seizure activity. He does smoke tobacco but denies illicit drug use. Upon arrival to the ED EKG was obtained revealing NSR with AV block. STAT CXR reveals hazy opacification of left lingula suggestive of focal pneumonia. CT scan of the abdomen and pelvis demonstrates acute colitis from splenic flexure to descending colon, concerning for ischemic colitis; infectious or inflammatory process is also possible. Splanchnic vasculature appears patent. Upon hospitalist arrival to bedside, patient was in obvious alcohol withdrawal with CIWA score of 26; IV Ativan administered promptly to avoid any further complications.   Consult also made to GI due to concerns for possible portal hypertension with variceal bleed, in case overnight band ligation becomes necessary. \"  Response To Previous Treatment: Not applicable  Family / Caregiver Present: No  Referring Practitioner: Heriberto Guzman MD  Referral Date : 12/02/22  Follows Commands: Within Functional Limits  General Comment  Comments: Supine in bed upon entry of therapy staff. Cleared for therapy by RN. Subjective  Subjective: Patient agreed to participate. Social/Functional History  Social/Functional History  Lives With: Alone  Type of Home: Apartment (1st)  Home Layout: One level  Home Access: Stairs to enter with rails  Entrance Stairs - Number of Steps: 4 LEONARDA  Entrance Stairs - Rails: Both  Bathroom Shower/Tub: Tub/Shower unit  Bathroom Toilet: Standard  Home Equipment:  (no DME)  Has the patient had two or more falls in the past year or any fall with injury in the past year?: Yes  ADL Assistance: Independent  Homemaking Assistance: Independent  Ambulation Assistance: Independent  Transfer Assistance: Independent  Active : Yes  Occupation: On disability  Leisure & Hobbies: go to a support group  Vision/Hearing  Vision  Vision: Impaired  Vision Exceptions: Wears glasses for reading  Hearing  Hearing: Within functional limits    Cognition   Cognition  Overall Cognitive Status: WFL  Cognition Comment: hx of anxiety     Objective   Heart Rate: 83  Heart Rate Source: Monitor  BP: 131/88  BP Location: Right upper arm  BP Method: Automatic  Patient Position: Sitting  MAP (Calculated): 102  Resp: 16  SpO2: 93 %  O2 Device: None (Room air)  Comment: post activity: 96% on room air. 85 BPM.        Gross Assessment  AROM: Within functional limits  PROM: Within functional limits  Strength:  Within functional limits  Coordination: Within functional limits                 Bed Mobility Training  Bed Mobility Training: Yes  Overall Level of Assistance: Independent  Supine to Sit: Independent  Sit to Supine: Independent  Scooting: Independent  Balance  Sitting: Intact  Standing: Intact  Transfer Training  Transfer Training: Yes  Overall Level of Assistance: Independent (no AD)  Sit to Stand: Independent  Stand to Sit: Independent  Stand Pivot Transfers: Independent  Bed to Chair: Independent  Toilet Transfer: Independent  Gait Training  Gait Training: Yes  Gait  Overall Level of Assistance: Independent  Interventions: Safety awareness training (occasional cueing provided at beginning of session due to patient was ambulating with IV pole. however patient required no cues at end of session.)  Gait Abnormalities: Decreased step clearance  Distance (ft): 200 Feet (No complaints of shortness of breath, chest pain or dizziness. No loss of balance.)  Assistive Device: Other (comment) (No assistive device.)  Rail Use: Right  Stairs - Level of Assistance: Modified independent  Number of Stairs Trained: 4 (No complaints of shortness of breath, chest pain or dizziness. No loss of balance.)              Balance  Posture: Good  Sitting - Static: Good  Sitting - Dynamic: Good  Standing - Static: Good  Standing - Dynamic: Good  Comments: no assistive device. AM-PAC Score  AM-PAC Inpatient Mobility Raw Score : 24 (12/03/22 1226)  AM-PAC Inpatient T-Scale Score : 61.14 (12/03/22 1226)  Mobility Inpatient CMS 0-100% Score: 0 (12/03/22 1226)  Mobility Inpatient CMS G-Code Modifier : [address] (12/03/22 1226)          Goals  Short Term Goals  Time Frame for Short Term Goals: 1 session 12/3/22  Short Term Goal 1: Patient will be independent with bed mobility and transfers. 12/3 goal met. Short Term Goal 2: Patient will ambulate 150 feet with no assistive device and modified independence. 12/3 goal met. Short Term Goal 3: Patient will ascend 4 steps with rail and modified independence. 12/3 goal met. Patient Goals   Patient Goals : To go home.        Education  Patient Education  Education Given To: Patient  Education Provided: Role of Therapy;Plan of Care; Fall Prevention Strategies  Education Provided Comments: Disease Specific Education: Patient educated on importance of out of bed mobility, prevention of complications of bedrest, and general safety (importance of using call bell) during hospitalization.  Patient verbalized understanding  Education Method: Demonstration;Verbal  Barriers to Learning: None  Education Outcome: Verbalized understanding;Demonstrated understanding      Therapy Time   Individual Concurrent Group Co-treatment   Time In 1145         Time Out 1201         Minutes 16         Timed Code Treatment Minutes: 6 Minutes (10 minute evaluation)       Devin Hampton, PT

## 2022-12-03 NOTE — PROGRESS NOTES
Physician Progress Note      PATIENT:               Arnold Howe  CSN #:                  [id number]  :                       1970  ADMIT DATE:       2022 2:57 PM  [address] [place] DATE:  RESPONDING  PROVIDER #:        Josefa Crowe MD          QUERY TEXT:    Patient admitted with rectal bleeding. Noted documentation of \"Sepsis with   acute colitis\" per H&P/ED provider and \"colitis. ..no evidence of sepsis\" on   attending PN 12/3. If possible, please document in progress notes and discharge summary if you   are evaluating and /or treating any of the following: The medical record reflects the following:  Risk Factors: ETOH use, rectal bleeding  Clinical Indicators: per H&P and ED provider- \"Sepsis with acute colitis\", per   attending PN 12/3-\"Colitis- presented with  - possibly infectious. Less likely ischemic. .. no evidence of sepsis\"  on arrival - , WBC 14.2, LA 3.7  Treatment: 30 ml/kg NS IVF bolus, followed but another 1 L IVF bolus, IV Zosyn    Thank you, Inocencio Ruiz RN, BSN  [email or web address]  Options provided:  -- Sepsis POA treated and resolved d/t bacterial colitis  -- Bacterial colitis confirmed and sepsis ruled out  -- Other - I will add my own diagnosis  -- Disagree - Not applicable / Not valid  -- Disagree - Clinically unable to determine / Unknown  -- Refer to Clinical Documentation Reviewer    PROVIDER RESPONSE TEXT:    Sepsis POA treated and resolved d/t bacterial colitis.     Query created by: Nataliya Hart on 12/3/2022 11:40 AM      Electronically signed by:  Josefa Crowe MD 12/3/2022 4:50 PM

## 2022-12-03 NOTE — PROGRESS NOTES
Occupational Therapy  Facility/Department: [facility]  Occupational Therapy Initial Assessment/Discharge  1x only    Name: Mary Rojas  : 1970  MRN: [id number]  Date of Service: 12/3/2022    Discharge Recommendations:  Home independently  OT Equipment Recommendations  Equipment Needed: No       Patient Diagnosis(es): The primary encounter diagnosis was Ischemic colitis ([facility] Diagnoses of Acute kidney injury ([facility] Severe sepsis ([facility] Hypokalemia, and Hypomagnesemia were also pertinent to this visit. Past Medical History:  has a past medical history of Depression, Hyperlipidemia, Hypertension, and PTSD (post-traumatic stress disorder). Past Surgical History:  has a past surgical history that includes Cardiac catheterization (2017). Treatment Diagnosis: impaired mobility      Assessment   Assessment: Pt is a 46 yo male admitted to [facility] with a dx of acute lower GI hemorrhage. Pt reports PTA living alone in a first floor apartment and IND with I/ADLs and mobility without AD. Currently pt is presenting at baseline for LB dressing, toileting, and mobility without AD. Skilled OT services during acute hospital stay is not warranted at this time. Recommend pt is safe to return home IND once medically stable. OT signing off. Treatment Diagnosis: impaired mobility  Prognosis: Good  Decision Making: Low Complexity  No Skilled OT: Independent with ADL's;At baseline function; Safe to return home  REQUIRES OT FOLLOW-UP: No  Activity Tolerance  Activity Tolerance: Patient Tolerated treatment well  Activity Tolerance Comments: Vitals WFL, Pt denies increase/decrease in pain during session        Plan   Occupational Therapy Plan  Times Per Week: 1x only     Restrictions  Restrictions/Precautions  Restrictions/Precautions: Seizure, Fall Risk, General Precautions  Position Activity Restriction  Other position/activity restrictions: IV, Tele    Subjective   General  Patient assessed for rehabilitation services?: Yes  Pt denies pain at rest  Social/Functional History  Social/Functional History  Lives With: Alone  Type of Home: Apartment (1st)  Home Layout: One level  Home Access: Stairs to enter with rails  Entrance Stairs - Number of Steps: 4 LEONARDA  Entrance Stairs - Rails: Both  Bathroom Shower/Tub: Tub/Shower unit  Bathroom Toilet: Standard  Home Equipment:  (no DME)  Has the patient had two or more falls in the past year or any fall with injury in the past year?: Yes  ADL Assistance: Independent  Homemaking Assistance: Independent  Ambulation Assistance: Independent  Transfer Assistance: Independent  Active : Yes  Occupation: On disability  Leisure & Hobbies: go to a support group       Objective   Heart Rate: 83  Heart Rate Source: Monitor  BP: 131/88  BP Location: Right upper arm  BP Method: Automatic  Patient Position: Sitting  MAP (Calculated): 102  Resp: 16  SpO2: 93 %  O2 Device: None (Room air)  Comment: post activity: 96% on room air. 85 BPM.          Observation/Palpation  Posture: Good  Safety Devices  Type of Devices: Nurse notified; Left in bed;Call light within reach (RN(Kendy) cleared pt to be IND for toileting within room)  Bed Mobility Training  Bed Mobility Training: Yes  Overall Level of Assistance: Independent  Supine to Sit: Independent  Sit to Supine: Independent  Scooting: Independent  Balance  Sitting: Intact  Standing: Intact  Transfer Training  Transfer Training: Yes  Overall Level of Assistance: Independent (no AD)  Sit to Stand: Independent  Stand to Sit: Independent  Stand Pivot Transfers: Independent  Bed to Chair: Independent  Toilet Transfer: Independent  Gait Training  Gait Training: Yes  Gait  Overall Level of Assistance: Independent  Interventions: Safety awareness training (occasional cueing provided at beginning of session due to patient was ambulating with IV pole. however patient required no cues at end of session.)  Gait Abnormalities: Decreased step clearance  Distance (ft): 200 Feet (No complaints of shortness of breath, chest pain or dizziness. No loss of balance.)  Assistive Device: Other (comment) (No assistive device.)  Rail Use: Right  Stairs - Level of Assistance: Modified independent  Number of Stairs Trained: 4 (No complaints of shortness of breath, chest pain or dizziness. No loss of balance.)     AROM: Within functional limits  Strength:  Within functional limits  Coordination: Within functional limits  Tone: Normal  Sensation: Intact  ADL  Feeding: Independent  Grooming: Independent  Grooming Skilled Clinical Factors: in stance to wash hands at sink  LE Dressing: Independent  LE Dressing Skilled Clinical Factors: sock management  Toileting: Independent  Additional Comments: Pt declining further ADLs              Vision  Vision: Impaired  Vision Exceptions: Wears glasses for reading  Hearing  Hearing: Within functional limits  Cognition  Overall Cognitive Status: WFL  Cognition Comment: hx of anxiety  Orientation  Overall Orientation Status: Within Functional Limits  Orientation Level: Oriented X4                  Education Given To: Patient  Education Provided: Role of Therapy;Plan of Care;Transfer Training  Education Method: Verbal  Barriers to Learning: None  Education Outcome: Verbalized understanding            AM-PAC Score  AM-[facility] Inpatient Daily Activity Raw Score: 24 (12/03/22 1159)  AM-PAC Inpatient ADL T-Scale Score : 57.54 (12/03/22 1159)  ADL Inpatient CMS 0-100% Score: 0 (12/03/22 1159)  ADL Inpatient CMS G-Code Modifier : [address] (12/03/22 1159)      Goals  Short Term Goals  Time Frame for Short Term Goals: 1 week (12/10) unless noted  Short Term Goal 1: Pt will complete toileting with IND- GOAL MET 12/03  Patient Goals   Patient goals : to return home       Therapy Time   Individual Concurrent Group Co-treatment   Time In 1145         Time Out 1201         Minutes 16         Timed Code Treatment Minutes: 0 Minutes (16 min eval only)     If pt is unable to be seen after this session, please let this note serve as discharge summary. Please see case management note for discharge disposition. Thank you.       Patricia Bermudez OT

## 2022-12-04 LAB
ALBUMIN SERPL-MCNC: 3.8 G/DL (ref 3.4–5)
ALP BLD-CCNC: 96 U/L (ref 40–129)
ALT SERPL-CCNC: 39 U/L (ref 10–40)
AST SERPL-CCNC: 27 U/L (ref 15–37)
BILIRUB SERPL-MCNC: 1.7 MG/DL (ref 0–1)
BILIRUBIN DIRECT: 0.4 MG/DL (ref 0–0.3)
BILIRUBIN, INDIRECT: 1.3 MG/DL (ref 0–1)
HCT VFR BLD CALC: 40.2 % (ref 40.5–52.5)
HCT VFR BLD CALC: 41.1 % (ref 40.5–52.5)
HCT VFR BLD CALC: 41.7 % (ref 40.5–52.5)
HCT VFR BLD CALC: 43 % (ref 40.5–52.5)
HEMOGLOBIN: 13.5 G/DL (ref 13.5–17.5)
HEMOGLOBIN: 13.7 G/DL (ref 13.5–17.5)
HEMOGLOBIN: 14.2 G/DL (ref 13.5–17.5)
HEMOGLOBIN: 14.7 G/DL (ref 13.5–17.5)
TOTAL PROTEIN: 6.2 G/DL (ref 6.4–8.2)

## 2022-12-04 PROCEDURE — 6360000002 HC RX W HCPCS: Performed by: HOSPITALIST

## 2022-12-04 PROCEDURE — 1200000000 HC SEMI PRIVATE

## 2022-12-04 PROCEDURE — C9113 INJ PANTOPRAZOLE SODIUM, VIA: HCPCS | Performed by: HOSPITALIST

## 2022-12-04 PROCEDURE — 85014 HEMATOCRIT: CPT

## 2022-12-04 PROCEDURE — 6370000000 HC RX 637 (ALT 250 FOR IP): Performed by: HOSPITALIST

## 2022-12-04 PROCEDURE — 6370000000 HC RX 637 (ALT 250 FOR IP): Performed by: INTERNAL MEDICINE

## 2022-12-04 PROCEDURE — 36415 COLL VENOUS BLD VENIPUNCTURE: CPT

## 2022-12-04 PROCEDURE — 80076 HEPATIC FUNCTION PANEL: CPT

## 2022-12-04 PROCEDURE — 85018 HEMOGLOBIN: CPT

## 2022-12-04 PROCEDURE — 2580000003 HC RX 258: Performed by: HOSPITALIST

## 2022-12-04 RX ORDER — CLONAZEPAM 0.5 MG/1
2 TABLET ORAL 2 TIMES DAILY PRN
Status: DISCONTINUED | OUTPATIENT
Start: 2022-12-04 | End: 2022-12-05 | Stop reason: HOSPADM

## 2022-12-04 RX ORDER — ATORVASTATIN CALCIUM 10 MG/1
20 TABLET, FILM COATED ORAL DAILY
Status: DISCONTINUED | OUTPATIENT
Start: 2022-12-04 | End: 2022-12-05 | Stop reason: HOSPADM

## 2022-12-04 RX ADMIN — Medication 1 TABLET: at 08:55

## 2022-12-04 RX ADMIN — LISINOPRIL 20 MG: 20 TABLET ORAL at 08:55

## 2022-12-04 RX ADMIN — Medication 100 MG: at 08:55

## 2022-12-04 RX ADMIN — SODIUM CHLORIDE, PRESERVATIVE FREE 10 ML: 5 INJECTION INTRAVENOUS at 09:02

## 2022-12-04 RX ADMIN — VENLAFAXINE HYDROCHLORIDE 225 MG: 37.5 CAPSULE, EXTENDED RELEASE ORAL at 08:55

## 2022-12-04 RX ADMIN — PIPERACILLIN AND TAZOBACTAM 3375 MG: 3; .375 INJECTION, POWDER, FOR SOLUTION INTRAVENOUS at 04:12

## 2022-12-04 RX ADMIN — PANTOPRAZOLE SODIUM 40 MG: 40 INJECTION, POWDER, FOR SOLUTION INTRAVENOUS at 08:55

## 2022-12-04 RX ADMIN — PIPERACILLIN AND TAZOBACTAM 3375 MG: 3; .375 INJECTION, POWDER, FOR SOLUTION INTRAVENOUS at 11:53

## 2022-12-04 RX ADMIN — SODIUM CHLORIDE, PRESERVATIVE FREE 10 ML: 5 INJECTION INTRAVENOUS at 19:44

## 2022-12-04 RX ADMIN — ATORVASTATIN CALCIUM 20 MG: 10 TABLET, FILM COATED ORAL at 19:43

## 2022-12-04 RX ADMIN — POLYETHYLENE GLYCOL 3350, SODIUM SULFATE ANHYDROUS, SODIUM BICARBONATE, SODIUM CHLORIDE, POTASSIUM CHLORIDE 2000 ML: 236; 22.74; 6.74; 5.86; 2.97 POWDER, FOR SOLUTION ORAL at 17:25

## 2022-12-04 RX ADMIN — PIPERACILLIN AND TAZOBACTAM 3375 MG: 3; .375 INJECTION, POWDER, FOR SOLUTION INTRAVENOUS at 19:42

## 2022-12-04 RX ADMIN — PANTOPRAZOLE SODIUM 40 MG: 40 INJECTION, POWDER, FOR SOLUTION INTRAVENOUS at 19:43

## 2022-12-04 ASSESSMENT — PAIN SCALES - GENERAL
PAINLEVEL_OUTOF10: 0
PAINLEVEL_OUTOF10: 0

## 2022-12-04 NOTE — PLAN OF CARE
Problem: Discharge Planning  Goal: Discharge to home or other facility with appropriate resources  Outcome: Progressing  Flowsheets (Taken 12/4/2022 0517)  Discharge to home or other facility with appropriate resources: Identify barriers to discharge with patient and caregiver     Problem: Pain  Goal: Verbalizes/displays adequate comfort level or baseline comfort level  Outcome: Progressing     Problem: Safety - Adult  Goal: Free from fall injury  Outcome: Progressing

## 2022-12-04 NOTE — PROGRESS NOTES
Hospitalist Progress Note      PCP: No primary care provider on file. Date of Admission: 12/2/2022    Chief Complaint: rectal bleeding    Hospital Course:   Virgilio Person is a 45 y.o. male who presented to the ED to be evaluated for several episodes of BRBPR occurring earlier this morning PTA. He reports that he has been experiencing crampy abdominal pain for several days, and that today he began to pass fresh blood with large clots. He states that he called 911 after experiencing a panic attack due to the amount of blood present in his toilet bowl. Patient denies underlying history of IBD or hemorrhoids. Upon further questioning, patient endorses a decades long history of EtOH abuse. He reports that he binge drinks 3-4 times weekly and the amount of 24+ beers. He reports that he has never gone to rehab or attempted to detox. Patient denies any previous seizure activity. He does smoke tobacco but denies illicit drug use. Upon arrival to the ED EKG was obtained revealing NSR with AV block. STAT CXR reveals hazy opacification of left lingula suggestive of focal pneumonia. CT scan of the abdomen and pelvis demonstrates acute colitis from splenic flexure to descending colon, concerning for ischemic colitis; infectious or inflammatory process is also possible. Splanchnic vasculature appears patent. Patient with multiple lab abnormalities including: Left shifted leukocytosis 14.2, H/H 15.1/44.7, lactate 3.7, transaminitis, hypokalemia 3.3, hypomagnesemia 1.5, hyponatremia 135, and JEN with BUNs/CR 21/1.4. Patient was initiated on large-volume IV resuscitation per ED provider due to concerns for acute sepsis. Following collection of blood cultures he received a one-time dosage of Zosyn. Upon hospitalist arrival to bedside, patient was in obvious alcohol withdrawal with CIWA score of 26; IV Ativan administered promptly to avoid any further complications.   Consult also made to GI due to concerns for possible portal hypertension with variceal bleed, in case overnight band ligation becomes necessary. Subjective: abdominal pain improved. No bleeding noted since admission. Medications:  Reviewed    Infusion Medications    sodium chloride      sodium chloride       Scheduled Medications    atorvastatin  20 mg Oral Daily    sodium chloride flush  10 mL IntraVENous 2 times per day    thiamine  100 mg Oral Daily    multivitamin  1 tablet Oral Daily    sodium chloride flush  5-40 mL IntraVENous 2 times per day    venlafaxine  225 mg Oral Daily with breakfast    lisinopril  20 mg Oral Daily    pantoprazole  40 mg IntraVENous BID    piperacillin-tazobactam  3,375 mg IntraVENous Q8H     PRN Meds: clonazePAM, sodium chloride flush, sodium chloride, labetalol, sodium chloride flush, sodium chloride, acetaminophen **OR** acetaminophen    No intake or output data in the 24 hours ending 12/04/22 1019      Physical Exam Performed:    /86   Pulse 93   Temp 98.3 °F (36.8 °C) (Oral)   Resp 18   Ht 6' 1\" (1.854 m)   Wt 186 lb 11.2 oz (84.7 kg)   SpO2 100%   BMI 24.63 kg/m²     General appearance: No apparent distress, appears stated age and cooperative. HEENT: Pupils equal, round, and reactive to light. Conjunctivae/corneas clear. Neck: Supple, with full range of motion. No jugular venous distention. Trachea midline. Respiratory:  Normal respiratory effort. Clear to auscultation, bilaterally without Rales/Wheezes/Rhonchi. Cardiovascular: tachycardia, regular rhythm with normal S1/S2 without murmurs, rubs or gallops. Abdomen: Soft, mild tenderness, non-distended with normal bowel sounds. Musculoskeletal: No clubbing, cyanosis or edema bilaterally. Full range of motion without deformity. Skin: Skin color, texture, turgor normal.  No rashes or lesions. Neurologic:  Neurovascularly intact without any focal sensory/motor deficits.  Cranial nerves: II-XII intact, grossly non-focal.  Psychiatric: Alert and oriented, thought content appropriate, normal insight  Capillary Refill: Brisk, 3 seconds, normal   Peripheral Pulses: +2 palpable, equal bilaterally       Labs:   Recent Labs     12/02/22  1622 12/03/22  0327 12/03/22  0838 12/03/22  2032 12/04/22  0259 12/04/22  0846   WBC 14.2* 8.8  --   --   --   --    HGB 15.1 13.9   < > 13.5 13.5 14.7   HCT 44.7 40.3*   < > 40.0* 40.2* 43.0    148  --   --   --   --     < > = values in this interval not displayed. Recent Labs     12/02/22  1622 12/03/22  0327   * 133*   K 3.3* 3.8   CL 95* 97*   CO2 26 29   BUN 21* 15   CREATININE 1.4* 1.1   CALCIUM 9.7 8.4     Recent Labs     12/02/22  1622 12/03/22 0327 12/04/22  0259   AST 61* 42* 27   ALT 64* 49* 39   BILIDIR  --   --  0.4*   BILITOT 1.1* 1.8* 1.7*   ALKPHOS 85 92 96     Recent Labs     12/02/22  1702   INR 0.96     Recent Labs     12/02/22  1622 12/02/22  2253   CKTOTAL  --  219   TROPONINI <0.01  --        Urinalysis:      Lab Results   Component Value Date/Time    NITRU Negative 12/02/2022 06:20 PM    BLOODU Negative 12/02/2022 06:20 PM    SPECGRAV 1.010 12/02/2022 06:20 PM    GLUCOSEU Negative 12/02/2022 06:20 PM       Radiology:  CT ABDOMEN PELVIS W IV CONTRAST Additional Contrast? None   Final Result   1. Acute colitis splenic flexure and descending colon, typical distribution   for ischemic colitis, though infection or inflammatory processes are a   consideration well. Splanchnic vasculature appears patent. 2. Mild calcific atherosclerosis aorta and branches. 3.  No findings to suggest acute appendicitis; no ureter calculus or   hydronephrosis. XR CHEST (2 VW)   Final Result   Hazy opacity lingula left upper lobe may reflect focal pneumonia or   subsegmental atelectasis. Chest radiograph surveillance recommended at 4-6   weeks to ensure clearing.              IP CONSULT TO GENERAL SURGERY  IP CONSULT TO SOCIAL WORK  IP CONSULT TO GI    Assessment/Plan:    Active Hospital Problems Diagnosis     Ischem[facility] ([facility] [K55.9]      Priority: Medium    Sepsis ([facility] [A41.9]      Priority: Medium    Alcohol withdrawal syndrome with complication ([facility] [I18.564]      Priority: Medium    Hypomagnesemia [E83.42]      Priority: Medium    Acute lower GI hemorrhage [K92.2]      Priority: Medium    Tobacco abuse [Z72.0]      Priority: Medium    Panic disorder [F41.0]     PTSD (post-traumatic stress disorder) [F43.10]     Essential hypertension [I10]      GI Bleed  - 2/2 colitis  - low concern for ischemia based on exam  - general surgery consulted  - GI consulted  - H/H stable    Sepsis 2/2 Colitis  - presented with tachycardia, leukocytosis. Lactate 3.7 on admission  - s/p IVF  - likely infectious colitis, less likely ischemic  - continue zosyn  - GI consulted  - continue clears  - possible colonoscopy tomorrow    Alcohol dependence  - no evidence of withdrawal  - counseling given  - vitamins ordered    HTN  - poorly controlled  - continue lisinopril. Holding HCTZ    HLD  - holding statin for now    Depression  - mood stable  - continue home effexor    DVT Prophylaxis: SCDs  Diet: ADULT DIET;  Clear Liquid  Code Status: Full Code  PT/OT Eval Status: not ordered    Dispo - likely home tomorrow following colonoscopy    Appropriate for A1 Discharge Unit: No      Mushtaq Dugan MD

## 2022-12-04 NOTE — CARE COORDINATION
CASE MANAGEMENT INITIAL ASSESSMENT      Reviewed chart and completed assessment with patient:yes  Family present: none  Explained Case Management role/services. yes      Primary Emergency contact: Shima Brown-[phone]    Admit date/status:12/2/22  Diagnosis:hypokalemia   Is this a Readmission?:  No      Insurance:VA   Precert required for SNF: Yes       3 night stay required: No    Living arrangements, Adls, care needs, prior to admission:lives alone IPTA. Active . Durable Medical Equipment at home:  Walker__Cane__RTS__ BSC__Shower Chair__  02__ HHN__ CPAP__  BiPap__  Hospital Bed__ W/C___ Other_____    Services in the home and/or outpatient, prior to admission:none    Current PCP:Cole ALVARENGA see's different doctors                                Medications:yes Prescription coverage? yesYes Will pt require financial assistance with medications no No     Transportation needs: will need cab voucher      PT/OT recs:not ordered    Hospital Exemption Notification (HEN):needed for SNF    Barriers to discharge:none    Plan/comments:patient plans to return to home and denies needs. Patient declined substance abuse information.       ECOC on chart for MD signature

## 2022-12-05 ENCOUNTER — ANESTHESIA (OUTPATIENT)
Dept: ENDOSCOPY | Age: 52
DRG: 872 | End: 2022-12-05
Payer: OTHER GOVERNMENT

## 2022-12-05 ENCOUNTER — ANESTHESIA EVENT (OUTPATIENT)
Dept: ENDOSCOPY | Age: 52
DRG: 872 | End: 2022-12-05
Payer: OTHER GOVERNMENT

## 2022-12-05 VITALS
HEIGHT: 73 IN | TEMPERATURE: 98.1 F | RESPIRATION RATE: 16 BRPM | BODY MASS INDEX: 24.6 KG/M2 | SYSTOLIC BLOOD PRESSURE: 138 MMHG | HEART RATE: 85 BPM | DIASTOLIC BLOOD PRESSURE: 93 MMHG | OXYGEN SATURATION: 98 % | WEIGHT: 185.6 LBS

## 2022-12-05 LAB
GI BACTERIAL PATHOGENS BY PCR: NORMAL
HCT VFR BLD CALC: 40 % (ref 40.5–52.5)
HEMOGLOBIN: 13.6 G/DL (ref 13.5–17.5)

## 2022-12-05 PROCEDURE — 3609010600 HC COLONOSCOPY POLYPECTOMY SNARE/COLD BIOPSY: Performed by: INTERNAL MEDICINE

## 2022-12-05 PROCEDURE — 7100000010 HC PHASE II RECOVERY - FIRST 15 MIN: Performed by: INTERNAL MEDICINE

## 2022-12-05 PROCEDURE — 2500000003 HC RX 250 WO HCPCS: Performed by: NURSE ANESTHETIST, CERTIFIED REGISTERED

## 2022-12-05 PROCEDURE — 2580000003 HC RX 258: Performed by: HOSPITALIST

## 2022-12-05 PROCEDURE — 0DBL8ZX EXCISION OF TRANSVERSE COLON, VIA NATURAL OR ARTIFICIAL OPENING ENDOSCOPIC, DIAGNOSTIC: ICD-10-PCS | Performed by: INTERNAL MEDICINE

## 2022-12-05 PROCEDURE — 6360000002 HC RX W HCPCS: Performed by: NURSE ANESTHETIST, CERTIFIED REGISTERED

## 2022-12-05 PROCEDURE — 7100000011 HC PHASE II RECOVERY - ADDTL 15 MIN: Performed by: INTERNAL MEDICINE

## 2022-12-05 PROCEDURE — 6370000000 HC RX 637 (ALT 250 FOR IP): Performed by: HOSPITALIST

## 2022-12-05 PROCEDURE — 6360000002 HC RX W HCPCS: Performed by: HOSPITALIST

## 2022-12-05 PROCEDURE — 88305 TISSUE EXAM BY PATHOLOGIST: CPT

## 2022-12-05 PROCEDURE — 0DBP8ZX EXCISION OF RECTUM, VIA NATURAL OR ARTIFICIAL OPENING ENDOSCOPIC, DIAGNOSTIC: ICD-10-PCS | Performed by: INTERNAL MEDICINE

## 2022-12-05 PROCEDURE — 6370000000 HC RX 637 (ALT 250 FOR IP): Performed by: INTERNAL MEDICINE

## 2022-12-05 PROCEDURE — C9113 INJ PANTOPRAZOLE SODIUM, VIA: HCPCS | Performed by: HOSPITALIST

## 2022-12-05 PROCEDURE — 3700000001 HC ADD 15 MINUTES (ANESTHESIA): Performed by: INTERNAL MEDICINE

## 2022-12-05 PROCEDURE — 85014 HEMATOCRIT: CPT

## 2022-12-05 PROCEDURE — 3700000000 HC ANESTHESIA ATTENDED CARE: Performed by: INTERNAL MEDICINE

## 2022-12-05 PROCEDURE — 2709999900 HC NON-CHARGEABLE SUPPLY: Performed by: INTERNAL MEDICINE

## 2022-12-05 PROCEDURE — 36415 COLL VENOUS BLD VENIPUNCTURE: CPT

## 2022-12-05 PROCEDURE — 85018 HEMOGLOBIN: CPT

## 2022-12-05 RX ORDER — LIDOCAINE HYDROCHLORIDE 20 MG/ML
INJECTION, SOLUTION INFILTRATION; PERINEURAL PRN
Status: DISCONTINUED | OUTPATIENT
Start: 2022-12-05 | End: 2022-12-05 | Stop reason: SDUPTHER

## 2022-12-05 RX ORDER — AMOXICILLIN AND CLAVULANATE POTASSIUM 875; 125 MG/1; MG/1
1 TABLET, FILM COATED ORAL 2 TIMES DAILY
Qty: 8 TABLET | Refills: 0 | Status: SHIPPED | OUTPATIENT
Start: 2022-12-05 | End: 2022-12-09

## 2022-12-05 RX ORDER — PROPOFOL 10 MG/ML
INJECTION, EMULSION INTRAVENOUS PRN
Status: DISCONTINUED | OUTPATIENT
Start: 2022-12-05 | End: 2022-12-05 | Stop reason: SDUPTHER

## 2022-12-05 RX ADMIN — SODIUM CHLORIDE: 9 INJECTION, SOLUTION INTRAVENOUS at 03:41

## 2022-12-05 RX ADMIN — PROPOFOL 20 MG: 10 INJECTION, EMULSION INTRAVENOUS at 09:03

## 2022-12-05 RX ADMIN — POLYETHYLENE GLYCOL-3350 AND ELECTROLYTES 2000 ML: 236; 6.74; 5.86; 2.97; 22.74 POWDER, FOR SOLUTION ORAL at 05:50

## 2022-12-05 RX ADMIN — Medication 1 TABLET: at 10:55

## 2022-12-05 RX ADMIN — PIPERACILLIN AND TAZOBACTAM 3375 MG: 3; .375 INJECTION, POWDER, FOR SOLUTION INTRAVENOUS at 03:42

## 2022-12-05 RX ADMIN — LIDOCAINE HYDROCHLORIDE 50 MG: 20 INJECTION, SOLUTION INFILTRATION; PERINEURAL at 09:02

## 2022-12-05 RX ADMIN — SODIUM CHLORIDE, PRESERVATIVE FREE 10 ML: 5 INJECTION INTRAVENOUS at 10:56

## 2022-12-05 RX ADMIN — PROPOFOL 20 MG: 10 INJECTION, EMULSION INTRAVENOUS at 09:13

## 2022-12-05 RX ADMIN — PROPOFOL 10 MG: 10 INJECTION, EMULSION INTRAVENOUS at 09:19

## 2022-12-05 RX ADMIN — PROPOFOL 10 MG: 10 INJECTION, EMULSION INTRAVENOUS at 09:22

## 2022-12-05 RX ADMIN — VENLAFAXINE HYDROCHLORIDE 225 MG: 37.5 CAPSULE, EXTENDED RELEASE ORAL at 10:55

## 2022-12-05 RX ADMIN — PROPOFOL 10 MG: 10 INJECTION, EMULSION INTRAVENOUS at 09:15

## 2022-12-05 RX ADMIN — PROPOFOL 20 MG: 10 INJECTION, EMULSION INTRAVENOUS at 09:17

## 2022-12-05 RX ADMIN — PROPOFOL 90 MG: 10 INJECTION, EMULSION INTRAVENOUS at 09:02

## 2022-12-05 RX ADMIN — PROPOFOL 20 MG: 10 INJECTION, EMULSION INTRAVENOUS at 09:07

## 2022-12-05 RX ADMIN — LISINOPRIL 20 MG: 20 TABLET ORAL at 10:55

## 2022-12-05 RX ADMIN — PROPOFOL 20 MG: 10 INJECTION, EMULSION INTRAVENOUS at 09:10

## 2022-12-05 RX ADMIN — PROPOFOL 20 MG: 10 INJECTION, EMULSION INTRAVENOUS at 09:05

## 2022-12-05 RX ADMIN — PROPOFOL 10 MG: 10 INJECTION, EMULSION INTRAVENOUS at 09:25

## 2022-12-05 RX ADMIN — PANTOPRAZOLE SODIUM 40 MG: 40 INJECTION, POWDER, FOR SOLUTION INTRAVENOUS at 10:55

## 2022-12-05 RX ADMIN — Medication 100 MG: at 10:55

## 2022-12-05 ASSESSMENT — PAIN SCALES - GENERAL
PAINLEVEL_OUTOF10: 0

## 2022-12-05 ASSESSMENT — LIFESTYLE VARIABLES: SMOKING_STATUS: 1

## 2022-12-05 NOTE — ANESTHESIA PRE PROCEDURE
Department of Anesthesiology  Preprocedure Note       Name:  Eric Morel   Age:  45 y.o.  :  1970                                          MRN:  [id number]         Date:  2022      Surgeon: Yumiko Dunlap):  Raquel Robbins DO    Procedure: Procedure(s):  COLONOSCOPY DIAGNOSTIC    Medications prior to admission:   Prior to Admission medications    Medication Sig Start Date End Date Taking?  Authorizing Provider   atorvastatin (LIPITOR) 40 MG tablet TAKE ONE-HALF TABLET BY MOUTH AT BEDTIME FOR CHOLESTEROL **NOTE DIRECTIONS**    Historical Provider, MD   fluticasone (FLONASE) 50 MCG/ACT nasal spray USE 2 SPRAYS IN EACH NOSTRIL ONCE DAILY FOR NASAL ALLERGY    Historical Provider, MD   lisinopril-hydrochlorothiazide (PRINZIDE;ZESTORETIC) 20-12.5 MG per tablet TAKE 1 TABLET BY MOUTH ONCE DAILY FOR BLOOD PRESSURE    Historical Provider, MD   buPROPion (WELLBUTRIN SR) 150 MG extended release tablet TAKE ONE TABLET BY MOUTH TWICE A DAY    Historical Provider, MD   venlafaxine (EFFEXOR XR) 75 MG extended release capsule Take 3 capsules by mouth daily (with breakfast)  Patient not taking: Reported on 2022 3/9/17   Giovanna Chung MD   clonazePAM (KLONOPIN) 2 MG tablet Take 2 mg by mouth 2 times daily as needed    Historical Provider, MD       Current medications:    Current Facility-Administered Medications   Medication Dose Route Frequency Provider Last Rate Last Admin    clonazePAM (KLONOPIN) tablet 2 mg  2 mg Oral BID PRN Fabricio Kitchen MD        atorvastatin (LIPITOR) tablet 20 mg  20 mg Oral Daily Fabricio Kitchen MD   20 mg at 22    sodium chloride flush 0.9 % injection 10 mL  10 mL IntraVENous 2 times per day Morteza Mcdonald MD   10 mL at 22    sodium chloride flush 0.9 % injection 10 mL  10 mL IntraVENous PRN Morteza Mcdonald MD        0.9 % sodium chloride infusion   IntraVENous PRN Morteza Mcdonald MD        thiamine tablet 100 mg  100 mg Oral Daily Morteza Mcdonald MD 100 mg at 12/04/22 2142    therapeutic multivitamin-minerals 1 tablet  1 tablet Oral Daily Cara Barnhart MD   1 tablet at 12/04/22 0855    labetalol (NORMODYNE;TRANDATE) injection 5 mg  5 mg IntraVENous Q4H PRN Cara Barnhart MD        sodium chloride flush 0.9 % injection 5-40 mL  5-40 mL IntraVENous 2 times per day Cara Barnhart MD   10 mL at 12/03/22 0843    sodium chloride flush 0.9 % injection 5-40 mL  5-40 mL IntraVENous PRN Cara Barnhart MD        0.9 % sodium chloride infusion   IntraVENous PRN Cara Barnhart MD 5 mL/hr at 12/05/22 0341 New Bag at 12/05/22 0341    acetaminophen (TYLENOL) tablet 650 mg  650 mg Oral Q6H PRN Cara Barnhart MD   650 mg at 12/03/22 2053    Or    acetaminophen (TYLENOL) suppository 650 mg  650 mg Rectal Q6H PRN Cara Barnhart MD        venlafaxine (EFFEXOR XR) extended release capsule 225 mg  225 mg Oral Daily with breakfast Cara Barnhart MD   225 mg at 12/04/22 0855    lisinopril (PRINIVIL;ZESTRIL) tablet 20 mg  20 mg Oral Daily Cara Barnhart MD   20 mg at 12/04/22 0855    pantoprazole (PROTONIX) injection 40 mg  40 mg IntraVENous BID Cara Barnhart MD   40 mg at 12/04/22 1943    piperacillin-tazobactam (ZOSYN) 3,375 mg in dextrose 5 % 50 mL IVPB (mini-bag)  3,375 mg IntraVENous Q8H Cara Barnhart MD 12.5 mL/hr at 12/05/22 0342 3,375 mg at 12/05/22 0342       Allergies:     Allergies   Allergen Reactions    Bactrim [Sulfamethoxazole-Trimethoprim]        Problem List:    Patient Active Problem List   Diagnosis Code    Essential hypertension I10    Hyperlipidemia E78.5    ACS (acute coronary syndrome) ([facility] I24.9    Chest pain R07.9    PTSD (post-traumatic stress disorder) F43.10    Panic disorder F41.0    Sepsis ([facility] A41.9    Alcohol withdrawal syndrome with complication ([facility] W68.808    Hypomagnesemia E83.42    Acute lower GI hemorrhage K92.2    Tobacco abuse Z72.0    Ischemic colitis ([facility] K55.9       Past Medical History: Diagnosis Date    Depression     Hyperlipidemia     Hypertension     PTSD (post-traumatic stress disorder)        Past Surgical History:        Procedure Laterality Date    CARDIAC CATHETERIZATION  03/07/2017    normal cath Dr Eneida Briseno       Social History:    Social History     Tobacco Use    Smoking status: Some Days     Packs/day: 0.50     Types: Cigarettes    Smokeless tobacco: Never   Substance Use Topics    Alcohol use: No                                Ready to quit: Not Answered  Counseling given: Not Answered      Vital Signs (Current):   Vitals:    12/05/22 0029 12/05/22 0403 12/05/22 0515 12/05/22 0746   BP: 119/89 111/79  125/83   Pulse: 87 75  65   Resp: 18 16  16   Temp: 98 °F (36.7 °C) 97.8 °F (36.6 °C)  97.8 °F (36.6 °C)   TempSrc: Oral Oral  Oral   SpO2: 100% 98%  94%   Weight:   185 lb 9.6 oz (84.2 kg)    Height:                                                  BP Readings from Last 3 Encounters:   12/05/22 125/83   06/22/21 125/81   05/24/19 (!) 164/99       NPO Status:                                                                                 BMI:   Wt Readings from Last 3 Encounters:   12/05/22 185 lb 9.6 oz (84.2 kg)   06/22/21 187 lb (84.8 kg)   05/24/19 195 lb (88.5 kg)     Body mass index is 24.49 kg/m².     CBC:   Lab Results   Component Value Date/Time    WBC 8.8 12/03/2022 03:27 AM    RBC 4.22 12/03/2022 03:27 AM    HGB 13.6 12/05/2022 03:44 AM    HCT 40.0 12/05/2022 03:44 AM    MCV 95.6 12/03/2022 03:27 AM    RDW 13.8 12/03/2022 03:27 AM     12/03/2022 03:27 AM       CMP:   Lab Results   Component Value Date/Time     12/03/2022 03:27 AM    K 3.8 12/03/2022 03:27 AM    CL 97 12/03/2022 03:27 AM    CO2 29 12/03/2022 03:27 AM    BUN 15 12/03/2022 03:27 AM    CREATININE 1.1 12/03/2022 03:27 AM    GFRAA >60 06/22/2021 08:53 PM    AGRATIO 1.7 12/03/2022 03:27 AM    LABGLOM >60 12/03/2022 03:27 AM    GLUCOSE 131 12/03/2022 03:27 AM    PROT 6.2 12/04/2022 02:59 AM CALCIUM 8.4 12/03/2022 03:27 AM    BILITOT 1.7 12/04/2022 02:59 AM    ALKPHOS 96 12/04/2022 02:59 AM    AST 27 12/04/2022 02:59 AM    ALT 39 12/04/2022 02:59 AM       POC Tests: No results for input(s): POCGLU, POCNA, POCK, POCCL, POCBUN, POCHEMO, POCHCT in the last 72 hours. Coags:   Lab Results   Component Value Date/Time    PROTIME 12.7 12/02/2022 05:02 PM    INR 0.96 12/02/2022 05:02 PM    APTT 31.2 12/02/2022 05:02 PM       HCG (If Applicable): No results found for: PREGTESTUR, PREGSERUM, HCG, HCGQUANT     ABGs: No results found for: PHART, PO2ART, BVO4SJF, KDX9IXT, BEART, D1PANVZA     Type & Screen (If Applicable):  No results found for: LABABO, LABRH    Drug/Infectious Status (If Applicable):  No results found for: HIV, HEPCAB    COVID-19 Screening (If Applicable):   Lab Results   Component Value Date/Time    COVID19 Not Detected 06/22/2021 11:01 PM           Anesthesia Evaluation  Patient summary reviewed and Nursing notes reviewed  Airway: Mallampati: II  TM distance: >3 FB   Neck ROM: full  Mouth opening: > = 3 FB   Dental: normal exam         Pulmonary:Negative Pulmonary ROS and normal exam    (+) COPD:  current smoker                           Cardiovascular:    (+) hypertension:, CAD:,                   Neuro/Psych:   Negative Neuro/Psych ROS  (+) psychiatric history (ETOH Abuse):            GI/Hepatic/Renal:   (+) liver disease:,           Endo/Other:                     Abdominal:             Vascular: negative vascular ROS.  + PVD, aortic or cerebral, . Other Findings:           Anesthesia Plan      MAC     ASA 4       Induction: intravenous. Anesthetic plan and risks discussed with patient. Plan discussed with CRNA.     Attending anesthesiologist reviewed and agrees with Preprocedure content                RUPERT Manzanares MD   12/5/2022

## 2022-12-05 NOTE — PLAN OF CARE
Problem: Discharge Planning  Goal: Discharge to home or other facility with appropriate resources  12/4/2022 2137 by Juan Carlos Almonte RN  Outcome: Progressing  Flowsheets (Taken 12/4/2022 2137)  Discharge to home or other facility with appropriate resources:   Identify barriers to discharge with patient and caregiver   Arrange for needed discharge resources and transportation as appropriate  12/4/2022 1350 by Nicko Pham RN  Outcome: Progressing  Flowsheets (Taken 12/4/2022 4513)  Discharge to home or other facility with appropriate resources: Identify barriers to discharge with patient and caregiver     Problem: Pain  Goal: Verbalizes/displays adequate comfort level or baseline comfort level  12/4/2022 2137 by Juan Carlos Almonte RN  Outcome: Progressing  Flowsheets (Taken 12/4/2022 2137)  Verbalizes/displays adequate comfort level or baseline comfort level:   Encourage patient to monitor pain and request assistance   Assess pain using appropriate pain scale  12/4/2022 1350 by Nicko Pham RN  Outcome: Progressing     Problem: Safety - Adult  Goal: Free from fall injury  12/4/2022 2137 by Juan Carlos Almonte RN  Outcome: Progressing  [address] (Taken 12/4/2022 2137)  Free From Fall Injury: Instruct family/caregiver on patient safety  12/4/2022 1350 by Nicko Pham RN  Outcome: Progressing     Problem: Infection - Adult  Goal: Absence of infection at discharge  Outcome: Progressing  Flowsheets (Taken 12/4/2022 2137)  Absence of infection at discharge:   Assess and monitor for signs and symptoms of infection   Administer medications as ordered     Problem: Hematologic - Adult  Goal: Maintains hematologic stability  Outcome: Progressing  Flowsheets (Taken 12/4/2022 2137)  Maintains hematologic stability:   Assess for signs and symptoms of bleeding or hemorrhage   Monitor labs for bleeding or clotting disorders

## 2022-12-05 NOTE — PROGRESS NOTES
Pt d/c'd home. Removed peripheral IV and stopped bleeding. Catheter intact. Pt tolerated well. No redness noted at site. Notified [facility] and removed tele box. Reviewed d/c instructions, home meds, and  f/u information utilizing teach-back method. Scripts picked up from [facility] and given to patient. Patient verbalized understanding.  Electronically signed by Tino Rendon RN on 12/5/22 at 1:40 PM EST

## 2022-12-05 NOTE — PROGRESS NOTES
[facility] notified of patient transporting to [facility] for Colonoscopy. Electronically signed by Marcus Kirk RN on 12/5/22 at 8:06 AM EST     [facility] notified of patients return to unit.  Electronically signed by Marcus Kirk RN on 12/5/22 at 10:30 AM EST

## 2022-12-05 NOTE — PROCEDURES
COLONOSCOPY     Patient: Tayler Jeffrey MRN: [id number]   YOB: 1970 Age: 45 y.o. Sex: male   Unit: [facility] ENDO Room/Bed: [facility] Endo Pool/NONE Location: [address]    Admitting Physician: David Ortiz     Primary Care Physician: No primary care provider on file. DATE OF PROCEDURE: 12/5/2022  PROCEDURE: Colonoscopy    PREOPERATIVE DIAGNOSIS: Rectal bleeding [K62.5]  Colitis [K52.9]  HPI: This is a 45y.o. year old male who presents today with bloody diarrhea. ENDOSCOPIST: Vandana Pizarro DO    POSTOPERATIVE DIAGNOSIS:    Colitis between 40 cm and 50 cm polyp flexure  Polyp splenic flexure-8 mm  Polyp transverse colon-8 mm  Polyp rectum-4 mm    PLAN:   Await results of biopsies  Okay for discharge from GI standpoint    INFORMED CONSENT:  Informed consent for colonoscopy was obtained. The benefits and risks including adverse medicine reaction and perforation have been explained. The patient's questions were answered and the patient agreed to proceed. ASA: ASA 2 - Patient with mild systemic disease with no functional limitations     SEDATION: MAC    The patient's vital signs, cardiac status, pulmonary status, abdominal status and mental status were stable for the procedure. The patient's vital signs and respiratory function as monitored by oxygen saturation remained stable. COLON PREPARATION:  The patient was given a split colon preparation and the preparation was adequate. However there was some solid debris and opaque stool obscuring some of the view. Procedure Details: An anal exam was performed and this was unremarkable. A digital rectal exam was performed and no masses palpated. The Olympus videocolonoscope  was inserted in the rectum and carefully advanced to the cecum as identified by IC valve, crow's foot appearance and appendix. The cecum was photodocumented.   The colonoscope was slowly withdrawn and retrograde examination of the colon was carefully performed with inspection around and between folds. The ascending colon and cecum were intubated twice with repeat antegrade and retrograde examination. There is no to be made millimeters sessile polyp in the transverse colon removed with cold snare in the splenic flexure another 8 mm polyp was removed with cold snare. In the splenic flexure there is approximately 10 cm of patchy ulceration edema consistent with colitis biopsies were obtained. An additional polyp was seen and removed from the rectum. Retroflexion in the rectum was performed.    Cecum Intubated: Yes          Estimated Blood Loss:  Minimal  Complications: None    Signed By: Esteban Corral DO

## 2022-12-05 NOTE — PROGRESS NOTES
Assessment  46 yo w/ PMHx significant for depression, HLD, HTN, PTSD, and alcohol abuse. No prior abdominal surgeries noted. Patient presented w/ rectal bleeding and lower abdominal pain becoming worse in the left abdomen after a few days of non-bloody diarrhea. CT imaging revealing mild wall thickening and pericolonic inflammatory changes in left colonic watershed region consistent with ischemic colitis. Rectal bleeding now resolved with ongoing diarrhea. WBC improved, Hgb reassuring at 14.2 this morning. No abdominal pain on exam today. Presentation most consistent with small vessel ischemic colitis, possible induced by volume depletion from alcohol abuse. Infectious colitis as an alternative consideration. Plan:  Awaiting results of GI pathogen PCR  Plan for colonoscopy tomorrow to further evaluate cause of colitis and bleeding. Clear liquid diet today, n.p.o. after midnight, split dose bowel preparation. Further recommendations to follow colonoscopy. Anticipate discharge tomorrow if reassuring. Receiving Zosyn, continue pending colonoscopy    Subjective: We are following for colitis and rectal bleeding. Patient states he has been doing fairly well. Notes only mild abdominal pain. Had 3 bowel movements today which were loose and brown with no signs of bleeding. Denies nausea or vomiting. Objective:    Review of Systems:    Constitutional: Negative for fever, chills, and unexpected weight change. HENT: Negative for trouble swallowing. Respiratory: Negative for cough, chest tightness and shortness of breath. Cardiovascular: Negative for chest pain  Gastrointestinal: see HPI  Musculoskeletal: Negative for unusual arthralgias. Skin: Negative for rash.      Scheduled Meds:   atorvastatin  20 mg Oral Daily    [START ON 12/5/2022] polyethylene glycol  2,000 mL Oral Once    sodium chloride flush  10 mL IntraVENous 2 times per day    thiamine  100 mg Oral Daily    multivitamin  1 tablet Oral Daily    sodium chloride flush  5-40 mL IntraVENous 2 times per day    venlafaxine  225 mg Oral Daily with breakfast    lisinopril  20 mg Oral Daily    pantoprazole  40 mg IntraVENous BID    piperacillin-tazobactam  3,375 mg IntraVENous Q8H     Continuous Infusions:   sodium chloride      sodium chloride         Vitals:  /87   Pulse 85   Temp 98.2 °F (36.8 °C) (Oral)   Resp 18   Ht 6' 1\" (1.854 m)   Wt 186 lb 11.2 oz (84.7 kg)   SpO2 96%   BMI 24.63 kg/m²     Exam:  General:  comfortable  Heent: There is no scleral icterus. Cardiovascular: The heart is regular rate and rhythm. Respiratory:  The patient's breathing is non-labored with normal chest wall excursion and normal muscle movement. Abdomen: The abdomen is nondistended, soft, and nontender. There are active bowel sounds. There are no masses or organomegaly  Rectal:  deferred   Neurological:  Gross memory appears intact. Patient is alert and oriented. Labs and Imaging:  I reviewed the labs and imaging results from last 24 hours. Recent Labs     12/02/22  1622 12/03/22  0327 12/03/22  0838 12/03/22  1455 12/03/22  2032 12/04/22  0259 12/04/22  0846 12/04/22  1507   HGB 15.1 13.9   < > 13.4* 13.5 13.5 14.7 14.2   WBC 14.2* 8.8  --   --   --   --   --   --    PROT 6.9 5.6*  --   --   --  6.2*  --   --    LABALBU 4.5 3.5  --   --   --  3.8  --   --    ALKPHOS 85 92  --   --   --  96  --   --    ALT 64* 49*  --   --   --  39  --   --    AST 61* 42*  --   --   --  27  --   --    BILITOT 1.1* 1.8*  --   --   --  1.7*  --   --    BILIDIR  --   --   --   --   --  0.4*  --   --    IBILI  --   --   --   --   --  1.3*  --   --     < > = values in this interval not displayed.         Deanna Campos MD, MD  December 4, 2022

## 2022-12-05 NOTE — ANESTHESIA POSTPROCEDURE EVALUATION
Department of Anesthesiology  Postprocedure Note    Patient: Zulma Sinha  MRN: [id number]  YOB: 1970  Date of evaluation: 12/5/2022      Procedure Summary     Date: 12/05/22 Room / Location: [facility] [address]    Anesthesia Start: 2786 Anesthesia Stop: 8735    Procedure: COLONOSCOPY POLYPECTOMY COLD BIOPSY Diagnosis:       Rectal bleeding      Colitis      (Rectal bleeding [K62.5])      (Colitis [K52.9])    Surgeons: Yumiko Claire DO Responsible Provider: Babak Hill MD    Anesthesia Type: MAC ASA Status: 4          Anesthesia Type: No value filed.     Triny Phase I:      Triny Phase II: Triny Score: 10      Anesthesia Post Evaluation    Patient location during evaluation: PACU  Patient participation: complete - patient participated  Level of consciousness: awake  Pain score: 0  Airway patency: patent  Nausea & Vomiting: no nausea  Complications: no  Cardiovascular status: blood pressure returned to baseline  Respiratory status: acceptable  Hydration status: euvolemic

## 2022-12-05 NOTE — H&P
[facility]   Procedure H&P    Patient: Ivette Vega MRN: [id number]     YOB: 1970  Age: 45 y.o. Sex: male    [facility]: [facility] Room/Bed: [facility] Pool/NONE Location: [address] [place] [address]     Procedure: Procedure(s):  COLONOSCOPY POLYPECTOMY COLD BIOPSY    Indication: Bloody diarrhea  Referring  Physician:        Brief history: CT scan shows colitis in the splenic flexure    Nurses past medical history notes reviewed and agreed. Medications reviewed. Allergies: Bactrim [sulfamethoxazole-trimethoprim]     Allergies noted: Yes     Past Medical History:   Past Medical History:   Diagnosis Date    Depression     Hyperlipidemia     Hypertension     PTSD (post-traumatic stress disorder)        Past Surgical History:   Past Surgical History:   Procedure Laterality Date    CARDIAC CATHETERIZATION  03/07/2017    normal cath Dr Sylvia Yancey       Social History:   Social History     Socioeconomic History    Marital status: Single     Spouse name: Not on file    Number of children: Not on file    Years of education: Not on file    Highest education level: Not on file   Occupational History    Not on file   Tobacco Use    Smoking status: Some Days     Packs/day: 0.50     Types: Cigarettes    Smokeless tobacco: Never   Substance and Sexual Activity    Alcohol use: No    Drug use: No    Sexual activity: Not on file   Other Topics Concern    Not on file   Social History Narrative    Not on file     Social Determinants of Health     Financial Resource Strain: Not on file   Food Insecurity: Not on file   Transportation Needs: Not on file   Physical Activity: Not on file   Stress: Not on file   Social Connections: Not on file   Intimate Partner Violence: Not on file   Housing Stability: Not on file       Family History: History reviewed. No pertinent family history. Home Medications:   Prior to Admission medications    Medication Sig Start Date End Date Taking?  Authorizing Provider   atorvastatin (LIPITOR) 40 MG tablet TAKE ONE-HALF TABLET BY MOUTH AT BEDTIME FOR CHOLESTEROL **NOTE DIRECTIONS**    Historical Provider, MD   fluticasone (FLONASE) 50 MCG/ACT nasal spray USE 2 SPRAYS IN EACH NOSTRIL ONCE DAILY FOR NASAL ALLERGY    Historical Provider, MD   lisinopril-hydrochlorothiazide (PRINZIDE;ZESTORETIC) 20-12.5 MG per tablet TAKE 1 TABLET BY MOUTH ONCE DAILY FOR BLOOD PRESSURE    Historical Provider, MD   buPROPion (WELLBUTRIN SR) 150 MG extended release tablet TAKE ONE TABLET BY MOUTH TWICE A DAY    Historical Provider, MD   venlafaxine (EFFEXOR XR) 75 MG extended release capsule Take 3 capsules by mouth daily (with breakfast)  Patient not taking: Reported on 12/2/2022 3/9/17   Foster Lundy MD   clonazePAM (KLONOPIN) 2 MG tablet Take 2 mg by mouth 2 times daily as needed    Historical Provider, MD       Review of Systems:  Weight Loss: No  Dysphagia: No  Dyspepsia: No    Physical Exam:   Vital Signs: /83   Pulse 65   Temp 97.8 °F (36.6 °C) (Oral)   Resp 16   Ht 6' 1\" (1.854 m)   Wt 185 lb 9.6 oz (84.2 kg)   SpO2 94%   BMI 24.49 kg/m²   Vital signs reviewed:Yes    HEENT:Normal  Cardiac:Normal  Chest:Normal  Abdomen:Normal  Exts: Normal  Neuro:Normal    Labs:  Admission on 12/02/2022   Component Date Value Ref Range Status    Ventricular Rate 12/02/2022 96  BPM Final    Atrial Rate 12/02/2022 96  BPM Final    P-R Interval 12/02/2022 212  ms Final    QRS Duration 12/02/2022 94  ms Final    Q-T Interval 12/02/2022 340  ms Final    QTc Calculation (Bazett) 12/02/2022 429  ms Final    P Axis 12/02/2022 8  degrees Final    R Axis 12/02/2022 3  degrees Final    T Axis 12/02/2022 -3  degrees Final    Diagnosis 12/02/2022 Sinus rhythm with 1st degree A-V blockConfirmed by Lino Farrell MD, [facility] ([id number]) on 12/3/2022 9:14:35 AM   Final    WBC 12/02/2022 14.2 (A)  4.0 - 11.0 K/uL Final    RBC 12/02/2022 4.71  4.20 - 5.90 M/uL Final    Hemoglobin 12/02/2022 15.1  13.5 - 17.5 g/dL Final    Hematocrit 12/02/2022 44.7 40.5 - 52.5 % Final    MCV 12/02/2022 94.9  80.0 - 100.0 fL Final    MCH 12/02/2022 32.0  26.0 - 34.0 pg Final    MCHC 12/02/2022 33.7  31.0 - 36.0 g/dL Final    RDW 12/02/2022 13.6  12.4 - 15.4 % Final    Platelets 18/27/3435 201  135 - 450 K/uL Final    MPV 12/02/2022 8.2  5.0 - 10.5 fL Final    PLATELET SLIDE REVIEW 12/02/2022 Clumped   Final    SLIDE REVIEW 12/02/2022 see below   Final    Neutrophils % 12/02/2022 76.0  % Final    Lymphocytes % 12/02/2022 16.0  % Final    Monocytes % 12/02/2022 7.2  % Final    Eosinophils % 12/02/2022 0.3  % Final    Basophils % 12/02/2022 0.5  % Final    Neutrophils Absolute 12/02/2022 10.8 (A)  1.7 - 7.7 K/uL Final    Lymphocytes Absolute 12/02/2022 2.3  1.0 - 5.1 K/uL Final    Monocytes Absolute 12/02/2022 1.0  0.0 - 1.3 K/uL Final    Eosinophils Absolute 12/02/2022 0.0  0.0 - 0.6 K/uL Final    Basophils Absolute 12/02/2022 0.1  0.0 - 0.2 K/uL Final    Sodium 12/02/2022 135 (A)  136 - 145 mmol/L Final    Potassium reflex Magnesium 12/02/2022 3.3 (A)  3.5 - 5.1 mmol/L Final    Chloride 12/02/2022 95 (A)  99 - 110 mmol/L Final    CO2 12/02/2022 26  21 - 32 mmol/L Final    Anion Gap 12/02/2022 14  3 - 16 Final    Glucose 12/02/2022 96  70 - 99 mg/dL Final    BUN 12/02/2022 21 (A)  7 - 20 mg/dL Final    Creatinine 12/02/2022 1.4 (A)  0.9 - 1.3 mg/dL Final    Est, Glom Filt Rate 12/02/2022 60 (A)  >60 Final    Calcium 12/02/2022 9.7  8.3 - 10.6 mg/dL Final    Total Protein 12/02/2022 6.9  6.4 - 8.2 g/dL Final    Albumin 12/02/2022 4.5  3.4 - 5.0 g/dL Final    Albumin/Globulin Ratio 12/02/2022 1.9  1.1 - 2.2 Final    Total Bilirubin 12/02/2022 1.1 (A)  0.0 - 1.0 mg/dL Final    Alkaline Phosphatase 12/02/2022 85  40 - 129 U/L Final    ALT 12/02/2022 64 (A)  10 - 40 U/L Final    AST 12/02/2022 61 (A)  15 - 37 U/L Final    Lipase 12/02/2022 47.0  13.0 - 60.0 U/L Final    Lactic Acid, Sepsis 12/02/2022 3.7 (A)  0.4 - 1.9 mmol/L Final    Lactic Acid, Sepsis 12/02/2022 2.0 (A)  0.4 - 1.9 mmol/L Final    Troponin 12/02/2022 <0.01  <0.01 ng/mL Final    Color, UA 12/02/2022 Yellow  Straw/Yellow Final    Clarity, UA 12/02/2022 Clear  Clear Final    Glucose, Ur 12/02/2022 Negative  Negative mg/dL Final    Bilirubin Urine 12/02/2022 Negative  Negative Final    Ketones, Urine 12/02/2022 Negative  Negative mg/dL Final    Specific Gravity, UA 12/02/2022 1.010  1.005 - 1.030 Final    Blood, Urine 12/02/2022 Negative  Negative Final    pH, UA 12/02/2022 6.5  5.0 - 8.0 Final    Protein, UA 12/02/2022 Negative  Negative mg/dL Final    Urobilinogen, Urine 12/02/2022 0.2  <2.0 E.U./dL Final    Nitrite, Urine 12/02/2022 Negative  Negative Final    Leukocyte Esterase, Urine 12/02/2022 Negative  Negative Final    Microscopic Examination 12/02/2022 Not Indicated   Final    Urine Type 12/02/2022 NotGiven   Final    Urine Reflex to Culture 12/02/2022 Not Indicated   Final    Ethanol Lvl 12/02/2022 42  mg/dL Final    Occult Blood Screening 12/02/2022  (A)   Final                    Value:Result: POSITIVE  Normal range: Negative      Protime 12/02/2022 12.7  11.7 - 14.5 sec Final    INR 12/02/2022 0.96  0.87 - 1.14 Final    Magnesium 12/02/2022 1.50 (A)  1.80 - 2.40 mg/dL Final    Sodium 12/03/2022 133 (A)  136 - 145 mmol/L Final    Potassium reflex Magnesium 12/03/2022 3.8  3.5 - 5.1 mmol/L Final    Chloride 12/03/2022 97 (A)  99 - 110 mmol/L Final    CO2 12/03/2022 29  21 - 32 mmol/L Final    Anion Gap 12/03/2022 7  3 - 16 Final    Glucose 12/03/2022 131 (A)  70 - 99 mg/dL Final    BUN 12/03/2022 15  7 - 20 mg/dL Final    Creatinine 12/03/2022 1.1  0.9 - 1.3 mg/dL Final    Est, Glom Filt Rate 12/03/2022 >60  >60 Final    Calcium 12/03/2022 8.4  8.3 - 10.6 mg/dL Final    Total Protein 12/03/2022 5.6 (A)  6.4 - 8.2 g/dL Final    Albumin 12/03/2022 3.5  3.4 - 5.0 g/dL Final    Albumin/Globulin Ratio 12/03/2022 1.7  1.1 - 2.2 Final    Total Bilirubin 12/03/2022 1.8 (A)  0.0 - 1.0 mg/dL Final    Alkaline Phosphatase 12/03/2022 92  40 - 129 U/L Final    ALT 12/03/2022 49 (A)  10 - 40 U/L Final    AST 12/03/2022 42 (A)  15 - 37 U/L Final    Magnesium 12/02/2022 1.70 (A)  1.80 - 2.40 mg/dL Final    Ammonia 12/02/2022 19  16 - 60 umol/L Final    Lipase 12/03/2022 39.0  13.0 - 60.0 U/L Final    Vitamin B-12 12/02/2022 481  211 - 911 pg/mL Final    TSH 12/02/2022 1.39  0.27 - 4.20 uIU/mL Final    Hemoglobin A1C 12/02/2022 5.8  See comment % Final    eAG 12/02/2022 119.8  mg/dL Final    WBC 12/03/2022 8.8  4.0 - 11.0 K/uL Final    RBC 12/03/2022 4.22  4.20 - 5.90 M/uL Final    Hemoglobin 12/03/2022 13.9  13.5 - 17.5 g/dL Final    Hematocrit 12/03/2022 40.3 (A)  40.5 - 52.5 % Final    MCV 12/03/2022 95.6  80.0 - 100.0 fL Final    MCH 12/03/2022 33.0  26.0 - 34.0 pg Final    MCHC 12/03/2022 34.5  31.0 - 36.0 g/dL Final    RDW 12/03/2022 13.8  12.4 - 15.4 % Final    Platelets 24/98/7344 148  135 - 450 K/uL Final    MPV 12/03/2022 7.4  5.0 - 10.5 fL Final    Neutrophils % 12/03/2022 65.1  % Final    Lymphocytes % 12/03/2022 25.5  % Final    Monocytes % 12/03/2022 7.0  % Final    Eosinophils % 12/03/2022 1.5  % Final    Basophils % 12/03/2022 0.9  % Final    Neutrophils Absolute 12/03/2022 5.8  1.7 - 7.7 K/uL Final    Lymphocytes Absolute 12/03/2022 2.3  1.0 - 5.1 K/uL Final    Monocytes Absolute 12/03/2022 0.6  0.0 - 1.3 K/uL Final    Eosinophils Absolute 12/03/2022 0.1  0.0 - 0.6 K/uL Final    Basophils Absolute 12/03/2022 0.1  0.0 - 0.2 K/uL Final    Amphetamine Screen, Urine 12/02/2022 Neg  Negative <1000ng/mL Final    Barbiturate Screen, Ur 12/02/2022 Neg  Negative <200 ng/mL Final    Benzodiazepine Screen, Urine 12/02/2022 Neg  Negative <200 ng/mL Final    Cannabinoid Scrn, Ur 12/02/2022 Neg  Negative <50 ng/mL Final    Cocaine Metabolite Screen, Urine 12/02/2022 Neg  Negative <300 ng/mL Final    Opiate Scrn, Ur 12/02/2022 POSITIVE (A)  Negative <300 ng/mL Final    PCP Screen, Urine 12/02/2022 Neg  Negative <25 ng/mL Final Methadone Screen, Urine 12/02/2022 Neg  Negative <300 ng/mL Final    Oxycodone Urine 12/02/2022 Neg  Negative <100 ng/ml Final    FENTANYL SCREEN, URINE 12/02/2022 Neg  Negative <5 ng/mL Final    pH, UA 12/02/2022 6.5   Final    Drug Screen Comment: 12/02/2022 see below   Final    aPTT 12/02/2022 31.2  23.0 - 34.3 sec Final    Specimen Status 12/02/2022 ROBBY   Final    ABO/Rh 12/02/2022 B POS   Final    Antibody Screen 12/02/2022 NEG   Final    Hemoglobin 12/03/2022 14.7  13.5 - 17.5 g/dL Final    Hematocrit 12/03/2022 43.2  40.5 - 52.5 % Final    Lactic Acid, Sepsis 12/02/2022 2.4 (A)  0.4 - 1.9 mmol/L Final    Total CK 12/02/2022 219  39 - 308 U/L Final    Lactic Acid 12/03/2022 2.1 (A)  0.4 - 2.0 mmol/L Final    Calcium, Ionized 12/03/2022 1.10 (A)  1.12 - 1.32 mmol/L Final    pH, Buster 12/03/2022 7.419  7.350 - 7.450 Final    Hemoglobin 12/03/2022 13.4 (A)  13.5 - 17.5 g/dL Final    Hematocrit 12/03/2022 39.0 (A)  40.5 - 52.5 % Final    Hemoglobin 12/03/2022 13.5  13.5 - 17.5 g/dL Final    Hematocrit 12/03/2022 40.0 (A)  40.5 - 52.5 % Final    GI Bacterial Pathogens By PCR 12/03/2022    Final                    Value:No Shigella spp/EIEC DNA detected  No Shiga toxin-producing gene(s) detected  No Campylobacter spp. (jejuni and coli)DNA detected  No Salmonella spp.  DNA detected  No Vibrio vulnificus/parahaemolyticus/cholerae DNA detected  No Plesiomonas shigelloides DNA detected  No Enterotoxigenic E. coli (ETEC) DNA detected  No Yersinia enterocolitica DNA detected  Normal Range:  None detected      Hemoglobin 12/04/2022 13.5  13.5 - 17.5 g/dL Final    Hematocrit 12/04/2022 40.2 (A)  40.5 - 52.5 % Final    Total Protein 12/04/2022 6.2 (A)  6.4 - 8.2 g/dL Final    Albumin 12/04/2022 3.8  3.4 - 5.0 g/dL Final    Alkaline Phosphatase 12/04/2022 96  40 - 129 U/L Final    ALT 12/04/2022 39  10 - 40 U/L Final    AST 12/04/2022 27  15 - 37 U/L Final    Total Bilirubin 12/04/2022 1.7 (A)  0.0 - 1.0 mg/dL Final Bilirubin, Direct 12/04/2022 0.4 (A)  0.0 - 0.3 mg/dL Final    Bilirubin, Indirect 12/04/2022 1.3 (A)  0.0 - 1.0 mg/dL Final    Hemoglobin 12/04/2022 14.7  13.5 - 17.5 g/dL Final    Hematocrit 12/04/2022 43.0  40.5 - 52.5 % Final    Hemoglobin 12/04/2022 14.2  13.5 - 17.5 g/dL Final    Hematocrit 12/04/2022 41.7  40.5 - 52.5 % Final    Hemoglobin 12/04/2022 13.7  13.5 - 17.5 g/dL Final    Hematocrit 12/04/2022 41.1  40.5 - 52.5 % Final    Hemoglobin 12/05/2022 13.6  13.5 - 17.5 g/dL Final    Hematocrit 12/05/2022 40.0 (A)  40.5 - 52.5 % Final        Imaging:  CT ABDOMEN PELVIS W IV CONTRAST Additional Contrast? None   Final Result   1. Acute colitis splenic flexure and descending colon, typical distribution   for ischemic colitis, though infection or inflammatory processes are a   consideration well. Splanchnic vasculature appears patent. 2. Mild calcific atherosclerosis aorta and branches. 3.  No findings to suggest acute appendicitis; no ureter calculus or   hydronephrosis. XR CHEST (2 VW)   Final Result   Hazy opacity lingula left upper lobe may reflect focal pneumonia or   subsegmental atelectasis. Chest radiograph surveillance recommended at 4-6   weeks to ensure clearing. ASA: 2    Mallampati Score: II    Sedation planned:MAC    Patient in acceptable condition for procedure:Yes    9:27 AM 12/5/2022    Raquel Robbins, DO      Please note that some or all of this record was generated using voice recognition software. If there are any questions about the content of this document, please contact the author as some errors in transcription may have occurred. The patient and I discussed that this is an elective procedure/surgery. We discussed the risks of the procedure/surgery, including but not limited to what is outlined in the signed informed consent. We also discussed the risk of giancarlo COVID 19 while in the facility.  We discussed the increased risk of a bad outcome should the patient contract COVID 19 during the post-procedural/post-operative period, given the patients current health condition, chronic conditions, and the added risk of COVID 19 in light of these conditions. The patient and I also discussed the risk of further postponing the procedure/surgery and other treatment alternatives, including non-procedural/surgical treatments. Understanding all of the risks, benefits, and alternatives, the patient made an informed decision to proceed with the procedure/surgery.

## 2022-12-06 NOTE — DISCHARGE SUMMARY
Hospital Medicine Discharge Summary    Patient ID: Yandy Blackwell      Patient's PCP: No primary care provider on file. Admit Date: 12/2/2022     Discharge Date: 12/5/2022      Admitting Provider: Jenny Adkins MD     Discharge Provider: Kenisha Seaman MD     Discharge Diagnoses: Active Hospital Problems    Diagnosis     Ischemic colitis ([facility]) [K55.9]      Priority: Medium    Sepsis ([facility]) [A41.9]      Priority: Medium    Alcohol withdrawal syndrome with complication ([facility]) [W84.383]      Priority: Medium    Hypomagnesemia [E83.42]      Priority: Medium    Acute lower GI hemorrhage [K92.2]      Priority: Medium    Tobacco abuse [Z72.0]      Priority: Medium    Panic disorder [F41.0]     PTSD (post-traumatic stress disorder) [F43.10]     Essential hypertension [I10]        The patient was seen and examined on day of discharge and this discharge summary is in conjunction with any daily progress note from day of discharge. Hospital Course:   Yandy Blackwell is a 45 y.o. male who presented to the ED to be evaluated for several episodes of BRBPR occurring earlier this morning PTA. He reports that he has been experiencing crampy abdominal pain for several days, and that today he began to pass fresh blood with large clots. He states that he called 911 after experiencing a panic attack due to the amount of blood present in his toilet bowl. Patient denies underlying history of IBD or hemorrhoids. Upon further questioning, patient endorses a decades long history of EtOH abuse. He reports that he binge drinks 3-4 times weekly and the amount of 24+ beers. He reports that he has never gone to rehab or attempted to detox. Patient denies any previous seizure activity. He does smoke tobacco but denies illicit drug use. Upon arrival to the ED EKG was obtained revealing NSR with AV block. STAT CXR reveals hazy opacification of left lingula suggestive of focal pneumonia.   CT scan of the abdomen and pelvis demonstrates acute colitis from splenic flexure to descending colon, concerning for ischemic colitis; infectious or inflammatory process is also possible. Splanchnic vasculature appears patent. Patient with multiple lab abnormalities including: Left shifted leukocytosis 14.2, H/H 15.1/44.7, lactate 3.7, transaminitis, hypokalemia 3.3, hypomagnesemia 1.5, hyponatremia 135, and JEN with BUNs/CR 21/1.4. Patient was initiated on large-volume IV resuscitation per ED provider due to concerns for acute sepsis. Following collection of blood cultures he received a one-time dosage of Zosyn. Upon hospitalist arrival to bedside, patient was in obvious alcohol withdrawal with CIWA score of 26; IV Ativan administered promptly to avoid any further complications. Consult also made to GI due to concerns for possible portal hypertension with variceal bleed, in case overnight band ligation becomes necessary. GI Bleed 2/2 colitis  - low concern for ischemia based on exam  - general surgery consulted  - GI consulted  - s/p colonoscopy, biopsies pending  - H/H stable     Sepsis 2/2 Colitis  - presented with tachycardia, leukocytosis. Lactate 3.7 on admission  - s/p IVF  - likely infectious colitis, less likely ischemic  - zosyn changed to augmentin to complete course  - GI consulted  - tolerating general diet     Alcohol dependence  - no evidence of withdrawal  - counseling given  - vitamins ordered     HTN  - poorly controlled  - continue lisinopril, HCTZ     HLD  - continue statin    Depression  - mood stable  - continue home effexor    Physical Exam Performed:     BP (!) 138/93   Pulse 85   Temp 98.1 °F (36.7 °C) (Oral)   Resp 16   Ht 6' 1\" (1.854 m)   Wt 185 lb 9.6 oz (84.2 kg)   SpO2 98%   BMI 24.49 kg/m²       General appearance: No apparent distress, appears stated age and cooperative. HEENT: Pupils equal, round, and reactive to light. Conjunctivae/corneas clear. Neck: Supple, with full range of motion.  No jugular venous distention. Trachea midline. Respiratory:  Normal respiratory effort. Clear to auscultation, bilaterally without Rales/Wheezes/Rhonchi. Cardiovascular: tachycardia, regular rhythm with normal S1/S2 without murmurs, rubs or gallops. Abdomen: Soft, mild tenderness, non-distended with normal bowel sounds. Musculoskeletal: No clubbing, cyanosis or edema bilaterally. Full range of motion without deformity. Skin: Skin color, texture, turgor normal.  No rashes or lesions. Neurologic:  Neurovascularly intact without any focal sensory/motor deficits. Cranial nerves: II-XII intact, grossly non-focal.  Psychiatric: Alert and oriented, thought content appropriate, normal insight  Capillary Refill: Brisk, 3 seconds, normal   Peripheral Pulses: +2 palpable, equal bilaterally       Labs: For convenience and continuity at follow-up the following most recent labs are provided:      CBC:    Lab Results   Component Value Date/Time    WBC 8.8 12/03/2022 03:27 AM    HGB 13.6 12/05/2022 03:44 AM    HCT 40.0 12/05/2022 03:44 AM     12/03/2022 03:27 AM       Renal:    Lab Results   Component Value Date/Time     12/03/2022 03:27 AM    K 3.8 12/03/2022 03:27 AM    CL 97 12/03/2022 03:27 AM    CO2 29 12/03/2022 03:27 AM    BUN 15 12/03/2022 03:27 AM    CREATININE 1.1 12/03/2022 03:27 AM    CALCIUM 8.4 12/03/2022 03:27 AM         Significant Diagnostic Studies    Radiology:   CT ABDOMEN PELVIS W IV CONTRAST Additional Contrast? None   Final Result   1. Acute colitis splenic flexure and descending colon, typical distribution   for ischemic colitis, though infection or inflammatory processes are a   consideration well. Splanchnic vasculature appears patent. 2. Mild calcific atherosclerosis aorta and branches. 3.  No findings to suggest acute appendicitis; no ureter calculus or   hydronephrosis.          XR CHEST (2 VW)   Final Result   Hazy opacity lingula left upper lobe may reflect focal pneumonia or subsegmental atelectasis. Chest radiograph surveillance recommended at 4-6   weeks to ensure clearing. Consults:     IP CONSULT TO GENERAL SURGERY  IP CONSULT TO SOCIAL WORK  IP CONSULT TO GI    Disposition:  home     Condition at Discharge: Stable    Discharge Instructions/Follow-up:  Follow up with PCP, general surgery within 1-2 weeks    Code Status:  full code    Activity: activity as tolerated    Diet: regular diet      Discharge Medications:     Discharge Medication List as of 12/5/2022 11:39 AM             Details   amoxicillin-clavulanate (AUGMENTIN) 875-125 MG per tablet Take 1 tablet by mouth 2 times daily for 4 days, Disp-8 tablet, R-0Normal                Details   atorvastatin (LIPITOR) 40 MG tablet TAKE ONE-HALF TABLET BY MOUTH AT BEDTIME FOR CHOLESTEROL **NOTE DIRECTIONS**Historical Med      fluticasone (FLONASE) 50 MCG/ACT nasal spray USE 2 SPRAYS IN EACH NOSTRIL ONCE DAILY FOR NASAL ALLERGYHistorical Med      lisinopril-hydrochlorothiazide (PRINZIDE;ZESTORETIC) 20-12.5 MG per tablet TAKE 1 TABLET BY MOUTH ONCE DAILY FOR BLOOD PRESSUREHistorical Med      buPROPion (WELLBUTRIN SR) 150 MG extended release tablet TAKE ONE TABLET BY MOUTH TWICE A DAYHistorical Med      clonazePAM (KLONOPIN) 2 MG tablet Take 2 mg by mouth 2 times daily as needed             Time Spent on discharge: 37 min in the examination, evaluation, counseling and review of medications and discharge plan. Signed:    Kameron Boggs MD   12/5/2022      Thank you No primary care provider on file. for the opportunity to be involved in this patient's care. If you have any questions or concerns, please feel free to contact me at [phone].

## 2023-06-21 NOTE — PROGRESS NOTES
Pt has been transferred down from C4. Pt has been settled and states he doesn't need anything. His VS are stable. He is currently drinking his 2L of bowel prep for his colonoscopy for tomorrow. Not currently scoring on CIWA. Will continue to monitor. Initial Size Of Lesion: 1.1

## 2023-09-27 ENCOUNTER — APPOINTMENT (OUTPATIENT)
Dept: GENERAL RADIOLOGY | Age: 53
End: 2023-09-27
Payer: OTHER GOVERNMENT

## 2023-09-27 ENCOUNTER — HOSPITAL ENCOUNTER (EMERGENCY)
Age: 53
Discharge: HOME | End: 2023-09-28
Attending: STUDENT IN AN ORGANIZED HEALTH CARE EDUCATION/TRAINING PROGRAM
Payer: OTHER GOVERNMENT

## 2023-09-27 DIAGNOSIS — R07.89 LEFT-SIDED CHEST WALL PAIN: Primary | ICD-10-CM

## 2023-09-27 DIAGNOSIS — F41.1 ANXIETY STATE: ICD-10-CM

## 2023-09-27 LAB
ALBUMIN SERPL-MCNC: 4.1 G/DL (ref 3.4–5)
ALBUMIN/GLOB SERPL: 1.6 {RATIO} (ref 1.1–2.2)
ALP SERPL-CCNC: 67 U/L (ref 40–129)
ALT SERPL-CCNC: 62 U/L (ref 10–40)
ANION GAP SERPL CALCULATED.3IONS-SCNC: 16 MMOL/L (ref 3–16)
AST SERPL-CCNC: 34 U/L (ref 15–37)
BASE EXCESS BLDV CALC-SCNC: 0.3 MMOL/L (ref -3–3)
BASOPHILS # BLD: 0.1 K/UL (ref 0–0.2)
BASOPHILS NFR BLD: 0.9 %
BILIRUB SERPL-MCNC: 0.3 MG/DL (ref 0–1)
BUN SERPL-MCNC: 18 MG/DL (ref 7–20)
CALCIUM SERPL-MCNC: 9.6 MG/DL (ref 8.3–10.6)
CHLORIDE SERPL-SCNC: 94 MMOL/L (ref 99–110)
CO2 BLDV-SCNC: 22 MMOL/L
CO2 SERPL-SCNC: 21 MMOL/L (ref 21–32)
COHGB MFR BLDV: 5.2 % (ref 0–1.5)
CREAT SERPL-MCNC: 1.1 MG/DL (ref 0.9–1.3)
D DIMER: 0.27 UG/ML FEU (ref 0–0.6)
DEPRECATED RDW RBC AUTO: 13.7 % (ref 12.4–15.4)
EOSINOPHIL # BLD: 0.2 K/UL (ref 0–0.6)
EOSINOPHIL NFR BLD: 1.5 %
GFR SERPLBLD CREATININE-BSD FMLA CKD-EPI: >60 ML/MIN/{1.73_M2}
GLUCOSE SERPL-MCNC: 87 MG/DL (ref 70–99)
HCO3 BLDV-SCNC: 21 MMOL/L (ref 23–29)
HCT VFR BLD AUTO: 43.1 % (ref 40.5–52.5)
HGB BLD-MCNC: 14.6 G/DL (ref 13.5–17.5)
LIPASE SERPL-CCNC: 61 U/L (ref 13–60)
LYMPHOCYTES # BLD: 3.8 K/UL (ref 1–5.1)
LYMPHOCYTES NFR BLD: 33.3 %
MCH RBC QN AUTO: 31.7 PG (ref 26–34)
MCHC RBC AUTO-ENTMCNC: 33.9 G/DL (ref 31–36)
MCV RBC AUTO: 93.5 FL (ref 80–100)
METHGB MFR BLDV: 0.3 %
MONOCYTES # BLD: 0.8 K/UL (ref 0–1.3)
MONOCYTES NFR BLD: 6.7 %
NEUTROPHILS # BLD: 6.6 K/UL (ref 1.7–7.7)
NEUTROPHILS NFR BLD: 57.6 %
NT-PROBNP SERPL-MCNC: <36 PG/ML (ref 0–124)
O2 CT VFR BLDV CALC: 20 VOL %
O2 THERAPY: ABNORMAL
PCO2 BLDV: 25.2 MMHG (ref 40–50)
PH BLDV: 7.54 [PH] (ref 7.35–7.45)
PLATELET # BLD AUTO: 215 K/UL (ref 135–450)
PMV BLD AUTO: 7.6 FL (ref 5–10.5)
PO2 BLDV: 99.2 MMHG (ref 25–40)
POTASSIUM SERPL-SCNC: 4.6 MMOL/L (ref 3.5–5.1)
PROT SERPL-MCNC: 6.7 G/DL (ref 6.4–8.2)
RBC # BLD AUTO: 4.61 M/UL (ref 4.2–5.9)
SAO2 % BLDV: 98 %
SODIUM SERPL-SCNC: 131 MMOL/L (ref 136–145)
TROPONIN, HIGH SENSITIVITY: 8 NG/L (ref 0–22)
WBC # BLD AUTO: 11.5 K/UL (ref 4–11)

## 2023-09-27 PROCEDURE — 96376 TX/PRO/DX INJ SAME DRUG ADON: CPT

## 2023-09-27 PROCEDURE — 84484 ASSAY OF TROPONIN QUANT: CPT

## 2023-09-27 PROCEDURE — 83880 ASSAY OF NATRIURETIC PEPTIDE: CPT

## 2023-09-27 PROCEDURE — 99285 EMERGENCY DEPT VISIT HI MDM: CPT

## 2023-09-27 PROCEDURE — 6360000002 HC RX W HCPCS: Performed by: STUDENT IN AN ORGANIZED HEALTH CARE EDUCATION/TRAINING PROGRAM

## 2023-09-27 PROCEDURE — 36415 COLL VENOUS BLD VENIPUNCTURE: CPT

## 2023-09-27 PROCEDURE — 85379 FIBRIN DEGRADATION QUANT: CPT

## 2023-09-27 PROCEDURE — 96375 TX/PRO/DX INJ NEW DRUG ADDON: CPT

## 2023-09-27 PROCEDURE — 96374 THER/PROPH/DIAG INJ IV PUSH: CPT

## 2023-09-27 PROCEDURE — 93005 ELECTROCARDIOGRAM TRACING: CPT | Performed by: STUDENT IN AN ORGANIZED HEALTH CARE EDUCATION/TRAINING PROGRAM

## 2023-09-27 PROCEDURE — 80053 COMPREHEN METABOLIC PANEL: CPT

## 2023-09-27 PROCEDURE — 83690 ASSAY OF LIPASE: CPT

## 2023-09-27 PROCEDURE — 71045 X-RAY EXAM CHEST 1 VIEW: CPT

## 2023-09-27 PROCEDURE — 82803 BLOOD GASES ANY COMBINATION: CPT

## 2023-09-27 PROCEDURE — 85025 COMPLETE CBC W/AUTO DIFF WBC: CPT

## 2023-09-27 PROCEDURE — 87636 SARSCOV2 & INF A&B AMP PRB: CPT

## 2023-09-27 RX ORDER — LORAZEPAM 2 MG/ML
1 INJECTION INTRAMUSCULAR ONCE
Status: COMPLETED | OUTPATIENT
Start: 2023-09-27 | End: 2023-09-27

## 2023-09-27 RX ADMIN — LORAZEPAM 1 MG: 2 INJECTION INTRAMUSCULAR; INTRAVENOUS at 23:28

## 2023-09-27 ASSESSMENT — PAIN DESCRIPTION - LOCATION: LOCATION: CHEST

## 2023-09-27 ASSESSMENT — PAIN SCALES - GENERAL: PAINLEVEL_OUTOF10: 6

## 2023-09-27 ASSESSMENT — PAIN DESCRIPTION - ORIENTATION: ORIENTATION: LEFT

## 2023-09-27 ASSESSMENT — LIFESTYLE VARIABLES
HOW MANY STANDARD DRINKS CONTAINING ALCOHOL DO YOU HAVE ON A TYPICAL DAY: 3 OR 4
HOW OFTEN DO YOU HAVE A DRINK CONTAINING ALCOHOL: PATIENT DECLINED

## 2023-09-27 ASSESSMENT — PAIN - FUNCTIONAL ASSESSMENT: PAIN_FUNCTIONAL_ASSESSMENT: 0-10

## 2023-09-28 ENCOUNTER — APPOINTMENT (OUTPATIENT)
Dept: CT IMAGING | Age: 53
End: 2023-09-28
Payer: OTHER GOVERNMENT

## 2023-09-28 VITALS
SYSTOLIC BLOOD PRESSURE: 101 MMHG | RESPIRATION RATE: 21 BRPM | OXYGEN SATURATION: 96 % | HEART RATE: 86 BPM | DIASTOLIC BLOOD PRESSURE: 71 MMHG | TEMPERATURE: 98.3 F

## 2023-09-28 LAB
AMPHETAMINES UR QL SCN>1000 NG/ML: NORMAL
BACTERIA URNS QL MICRO: ABNORMAL /HPF
BARBITURATES UR QL SCN>200 NG/ML: NORMAL
BENZODIAZ UR QL SCN>200 NG/ML: NORMAL
BILIRUB UR QL STRIP.AUTO: NEGATIVE
CANNABINOIDS UR QL SCN>50 NG/ML: NORMAL
CLARITY UR: CLEAR
COCAINE UR QL SCN: NORMAL
COLOR UR: YELLOW
DRUG SCREEN COMMENT UR-IMP: NORMAL
EKG ATRIAL RATE: 92 BPM
EKG DIAGNOSIS: NORMAL
EKG P-R INTERVAL: 164 MS
EKG Q-T INTERVAL: 350 MS
EKG QRS DURATION: 90 MS
EKG QTC CALCULATION (BAZETT): 432 MS
EKG R AXIS: -60 DEGREES
EKG T AXIS: 19 DEGREES
EKG VENTRICULAR RATE: 92 BPM
EPI CELLS #/AREA URNS HPF: ABNORMAL /HPF (ref 0–5)
FENTANYL SCREEN, URINE: NORMAL
FLUAV RNA RESP QL NAA+PROBE: NOT DETECTED
FLUBV RNA RESP QL NAA+PROBE: NOT DETECTED
GLUCOSE UR STRIP.AUTO-MCNC: NEGATIVE MG/DL
HGB UR QL STRIP.AUTO: NEGATIVE
KETONES UR STRIP.AUTO-MCNC: NEGATIVE MG/DL
LEUKOCYTE ESTERASE UR QL STRIP.AUTO: NEGATIVE
METHADONE UR QL SCN>300 NG/ML: NORMAL
NITRITE UR QL STRIP.AUTO: NEGATIVE
OPIATES UR QL SCN>300 NG/ML: NORMAL
OXYCODONE UR QL SCN: NORMAL
PCP UR QL SCN>25 NG/ML: NORMAL
PH UR STRIP.AUTO: 6 [PH] (ref 5–8)
PH UR STRIP: 6 [PH]
PROT UR STRIP.AUTO-MCNC: NEGATIVE MG/DL
RBC #/AREA URNS HPF: ABNORMAL /HPF (ref 0–4)
SARS-COV-2 RNA RESP QL NAA+PROBE: NOT DETECTED
SP GR UR STRIP.AUTO: 1.01 (ref 1–1.03)
TROPONIN, HIGH SENSITIVITY: 8 NG/L (ref 0–22)
UA DIPSTICK W REFLEX MICRO PNL UR: ABNORMAL
URN SPEC COLLECT METH UR: ABNORMAL
UROBILINOGEN UR STRIP-ACNC: 0.2 E.U./DL
WBC #/AREA URNS HPF: ABNORMAL /HPF (ref 0–5)

## 2023-09-28 PROCEDURE — 84484 ASSAY OF TROPONIN QUANT: CPT

## 2023-09-28 PROCEDURE — 80307 DRUG TEST PRSMV CHEM ANLYZR: CPT

## 2023-09-28 PROCEDURE — 81001 URINALYSIS AUTO W/SCOPE: CPT

## 2023-09-28 PROCEDURE — 71260 CT THORAX DX C+: CPT

## 2023-09-28 PROCEDURE — 6360000004 HC RX CONTRAST MEDICATION: Performed by: STUDENT IN AN ORGANIZED HEALTH CARE EDUCATION/TRAINING PROGRAM

## 2023-09-28 PROCEDURE — 6360000002 HC RX W HCPCS: Performed by: STUDENT IN AN ORGANIZED HEALTH CARE EDUCATION/TRAINING PROGRAM

## 2023-09-28 PROCEDURE — 36415 COLL VENOUS BLD VENIPUNCTURE: CPT

## 2023-09-28 PROCEDURE — 74177 CT ABD & PELVIS W/CONTRAST: CPT

## 2023-09-28 RX ORDER — MORPHINE SULFATE 2 MG/ML
2 INJECTION, SOLUTION INTRAMUSCULAR; INTRAVENOUS ONCE
Status: COMPLETED | OUTPATIENT
Start: 2023-09-28 | End: 2023-09-28

## 2023-09-28 RX ORDER — LORAZEPAM 2 MG/ML
0.5 INJECTION INTRAMUSCULAR ONCE
Status: COMPLETED | OUTPATIENT
Start: 2023-09-28 | End: 2023-09-28

## 2023-09-28 RX ADMIN — IOPAMIDOL 75 ML: 755 INJECTION, SOLUTION INTRAVENOUS at 02:11

## 2023-09-28 RX ADMIN — LORAZEPAM 0.5 MG: 2 INJECTION INTRAMUSCULAR; INTRAVENOUS at 02:35

## 2023-09-28 RX ADMIN — MORPHINE SULFATE 2 MG: 2 INJECTION, SOLUTION INTRAMUSCULAR; INTRAVENOUS at 02:35

## 2023-09-28 ASSESSMENT — PAIN DESCRIPTION - LOCATION: LOCATION: GENERALIZED

## 2023-09-28 ASSESSMENT — PAIN SCALES - GENERAL: PAINLEVEL_OUTOF10: 6

## 2023-09-28 ASSESSMENT — PAIN - FUNCTIONAL ASSESSMENT: PAIN_FUNCTIONAL_ASSESSMENT: NONE - DENIES PAIN

## 2023-09-28 NOTE — ED TRIAGE NOTES
Pt presents to ED for CP and panic attack. States that he has a hx of panic attacks and that his prn medication is not working.

## 2023-09-28 NOTE — ED NOTES
12 ECG lead completed. Given to Dr. Diogenes Juan for review.       [facility]  09/27/23 [id number]

## 2023-09-28 NOTE — ED NOTES
Encouraged pt to give urine sample, pt refused, c/o left upper quad pain, notified provider     Laura Cruz RN  09/28/23 [id number]

## 2023-09-28 NOTE — DISCHARGE INSTRUCTIONS
Follow-up with your primary doctor as soon as able. Your abdominal CT is below. Please follow-up for further evaluation and treatment of continued pain and also return if you have any fevers, dysuria or pain when you pee, increased frequency, urgency, hematuria also return for any repeat episodes of chest pain, shortness of breath or any new change or worsening symptoms were always here for evaluation never hesitate to return. Abdominal CT  1. Kidneys are enhancing equally and starting to excrete contrast.  This  could obscure tiny calculi. However there is no hydronephrosis or  hydroureter on either side. 2.  Minimal perinephric stranding appears symmetric and may relate to medical  renal disease. No focal perinephric collection. 3.  No appendicitis, diverticulitis, bowel obstruction, or pneumoperitoneum. 4.  There is fatty metamorphosis of the liver.

## (undated) DEVICE — ENDOSCOPIC KIT 2 12 FT OP4 DE2 GWN SYR

## (undated) DEVICE — CANNULA NSL L4M O2 AD FILTERLINE

## (undated) DEVICE — SNARE ENDOSCP L240CM SHTH DIA24MM LOOP W10MM POLYP RND REINF

## (undated) DEVICE — FORCEPS BX L240CM JAW DIA2.8MM L CAP W/ NDL MIC MESH TOOTH

## (undated) DEVICE — ELECTRODE,ECG,STRESS,FOAM,3PK: Brand: MEDLINE

## (undated) DEVICE — TRAP SPEC POLYPR SGL CHMBR FN MESH SCRN